# Patient Record
Sex: FEMALE | Race: WHITE | NOT HISPANIC OR LATINO
[De-identification: names, ages, dates, MRNs, and addresses within clinical notes are randomized per-mention and may not be internally consistent; named-entity substitution may affect disease eponyms.]

---

## 2017-07-18 ENCOUNTER — APPOINTMENT (OUTPATIENT)
Dept: ULTRASOUND IMAGING | Facility: IMAGING CENTER | Age: 57
End: 2017-07-18

## 2017-07-18 ENCOUNTER — APPOINTMENT (OUTPATIENT)
Dept: MAMMOGRAPHY | Facility: IMAGING CENTER | Age: 57
End: 2017-07-18

## 2017-07-18 ENCOUNTER — OUTPATIENT (OUTPATIENT)
Dept: OUTPATIENT SERVICES | Facility: HOSPITAL | Age: 57
LOS: 1 days | End: 2017-07-18
Payer: COMMERCIAL

## 2017-07-18 DIAGNOSIS — Z00.8 ENCOUNTER FOR OTHER GENERAL EXAMINATION: ICD-10-CM

## 2017-07-18 PROCEDURE — 77067 SCR MAMMO BI INCL CAD: CPT

## 2017-07-18 PROCEDURE — 77063 BREAST TOMOSYNTHESIS BI: CPT

## 2017-07-18 PROCEDURE — 76641 ULTRASOUND BREAST COMPLETE: CPT

## 2017-07-21 DIAGNOSIS — Z12.31 ENCOUNTER FOR SCREENING MAMMOGRAM FOR MALIGNANT NEOPLASM OF BREAST: ICD-10-CM

## 2017-07-21 DIAGNOSIS — R92.2 INCONCLUSIVE MAMMOGRAM: ICD-10-CM

## 2017-08-24 ENCOUNTER — RESULT REVIEW (OUTPATIENT)
Age: 57
End: 2017-08-24

## 2018-01-18 ENCOUNTER — APPOINTMENT (OUTPATIENT)
Dept: ULTRASOUND IMAGING | Facility: HOSPITAL | Age: 58
End: 2018-01-18

## 2018-01-18 ENCOUNTER — OUTPATIENT (OUTPATIENT)
Dept: OUTPATIENT SERVICES | Facility: HOSPITAL | Age: 58
LOS: 1 days | Discharge: HOME | End: 2018-01-18
Payer: COMMERCIAL

## 2018-01-18 DIAGNOSIS — R10.9 UNSPECIFIED ABDOMINAL PAIN: ICD-10-CM

## 2018-01-18 PROCEDURE — 76700 US EXAM ABDOM COMPLETE: CPT | Mod: 26

## 2018-10-01 ENCOUNTER — APPOINTMENT (OUTPATIENT)
Dept: MAMMOGRAPHY | Facility: IMAGING CENTER | Age: 58
End: 2018-10-01
Payer: COMMERCIAL

## 2018-10-01 ENCOUNTER — APPOINTMENT (OUTPATIENT)
Dept: ULTRASOUND IMAGING | Facility: IMAGING CENTER | Age: 58
End: 2018-10-01
Payer: COMMERCIAL

## 2018-10-01 ENCOUNTER — OUTPATIENT (OUTPATIENT)
Dept: OUTPATIENT SERVICES | Facility: HOSPITAL | Age: 58
LOS: 1 days | End: 2018-10-01
Payer: COMMERCIAL

## 2018-10-01 DIAGNOSIS — Z00.8 ENCOUNTER FOR OTHER GENERAL EXAMINATION: ICD-10-CM

## 2018-10-01 PROCEDURE — 77067 SCR MAMMO BI INCL CAD: CPT | Mod: 26

## 2018-10-01 PROCEDURE — 76641 ULTRASOUND BREAST COMPLETE: CPT | Mod: 26,50

## 2018-10-01 PROCEDURE — 76641 ULTRASOUND BREAST COMPLETE: CPT

## 2018-10-01 PROCEDURE — 77063 BREAST TOMOSYNTHESIS BI: CPT | Mod: 26

## 2018-10-01 PROCEDURE — 77067 SCR MAMMO BI INCL CAD: CPT

## 2018-10-01 PROCEDURE — 77063 BREAST TOMOSYNTHESIS BI: CPT

## 2018-10-23 ENCOUNTER — RESULT REVIEW (OUTPATIENT)
Age: 58
End: 2018-10-23

## 2018-10-28 ENCOUNTER — TRANSCRIPTION ENCOUNTER (OUTPATIENT)
Age: 58
End: 2018-10-28

## 2018-10-28 DIAGNOSIS — Z87.891 PERSONAL HISTORY OF NICOTINE DEPENDENCE: ICD-10-CM

## 2018-10-28 DIAGNOSIS — Z86.19 PERSONAL HISTORY OF OTHER INFECTIOUS AND PARASITIC DISEASES: ICD-10-CM

## 2018-10-28 DIAGNOSIS — R21 RASH AND OTHER NONSPECIFIC SKIN ERUPTION: ICD-10-CM

## 2018-10-28 DIAGNOSIS — J06.9 ACUTE UPPER RESPIRATORY INFECTION, UNSPECIFIED: ICD-10-CM

## 2018-10-28 DIAGNOSIS — Z87.898 PERSONAL HISTORY OF OTHER SPECIFIED CONDITIONS: ICD-10-CM

## 2018-11-16 PROBLEM — J06.9 ACUTE URI: Status: RESOLVED | Noted: 2018-11-16 | Resolved: 2018-11-16

## 2018-11-16 PROBLEM — Z86.19 HISTORY OF VIRAL INFECTION: Status: RESOLVED | Noted: 2018-11-16 | Resolved: 2018-11-16

## 2018-11-16 PROBLEM — Z87.898 HISTORY OF HEADACHE: Status: RESOLVED | Noted: 2018-11-16 | Resolved: 2018-11-16

## 2018-11-16 PROBLEM — R21 RASH: Status: RESOLVED | Noted: 2018-11-16 | Resolved: 2018-11-16

## 2018-11-27 ENCOUNTER — RECORD ABSTRACTING (OUTPATIENT)
Age: 58
End: 2018-11-27

## 2018-12-07 ENCOUNTER — APPOINTMENT (OUTPATIENT)
Dept: INTERNAL MEDICINE | Facility: CLINIC | Age: 58
End: 2018-12-07
Payer: COMMERCIAL

## 2018-12-07 ENCOUNTER — RESULT CHARGE (OUTPATIENT)
Age: 58
End: 2018-12-07

## 2018-12-07 ENCOUNTER — NON-APPOINTMENT (OUTPATIENT)
Age: 58
End: 2018-12-07

## 2018-12-07 VITALS
HEART RATE: 68 BPM | SYSTOLIC BLOOD PRESSURE: 98 MMHG | WEIGHT: 98 LBS | BODY MASS INDEX: 17.15 KG/M2 | DIASTOLIC BLOOD PRESSURE: 70 MMHG | HEIGHT: 63.5 IN

## 2018-12-07 DIAGNOSIS — Z87.19 PERSONAL HISTORY OF OTHER DISEASES OF THE DIGESTIVE SYSTEM: ICD-10-CM

## 2018-12-07 LAB
BILIRUB UR QL STRIP: NEGATIVE
CLARITY UR: CLEAR
COLLECTION METHOD: NORMAL
GLUCOSE UR-MCNC: NEGATIVE
HCG UR QL: 0.2 EU/DL
HGB UR QL STRIP.AUTO: NEGATIVE
KETONES UR-MCNC: NEGATIVE
LEUKOCYTE ESTERASE UR QL STRIP: NEGATIVE
NITRITE UR QL STRIP: NEGATIVE
PH UR STRIP: 5.5
PROT UR STRIP-MCNC: NEGATIVE
SP GR UR STRIP: 1.02

## 2018-12-07 PROCEDURE — 94010 BREATHING CAPACITY TEST: CPT

## 2018-12-07 PROCEDURE — 93000 ELECTROCARDIOGRAM COMPLETE: CPT

## 2018-12-07 PROCEDURE — 99396 PREV VISIT EST AGE 40-64: CPT | Mod: 25

## 2018-12-07 PROCEDURE — 36415 COLL VENOUS BLD VENIPUNCTURE: CPT

## 2018-12-07 PROCEDURE — 81002 URINALYSIS NONAUTO W/O SCOPE: CPT

## 2018-12-07 RX ORDER — MULTIVIT-MIN/FOLIC/VIT K/LYCOP 400-300MCG
1000 TABLET ORAL
Refills: 0 | Status: DISCONTINUED | COMMUNITY
End: 2018-12-07

## 2018-12-07 RX ORDER — ADHESIVE TAPE 3"X 2.3 YD
50 MCG TAPE, NON-MEDICATED TOPICAL
Refills: 0 | Status: ACTIVE | COMMUNITY
Start: 2018-12-07

## 2018-12-07 RX ORDER — ATORVASTATIN CALCIUM 20 MG/1
20 TABLET, FILM COATED ORAL
Refills: 0 | Status: ACTIVE | COMMUNITY

## 2018-12-07 NOTE — HEALTH RISK ASSESSMENT
[Excellent] : ~his/her~  mood as  excellent [Patient reported mammogram was normal] : Patient reported mammogram was normal [Patient reported PAP Smear was normal] : Patient reported PAP Smear was normal [Patient reported bone density results were abnormal] : Patient reported bone density results were abnormal [Patient reported colonoscopy was normal] : Patient reported colonoscopy was normal [FreeTextEntry1] : Although she walks several miles every day for exercise she becomes dyspneic on climbing several flights of stairs. [] : No [MammogramDate] : 2018 [BoneDensityDate] : 2016 [BoneDensityComments] : osteoporosis under treatment  [ColonoscopyDate] : 2018 [ColonoscopyComments] : Kavin have seen / treated

## 2018-12-07 NOTE — ASSESSMENT
[FreeTextEntry1] : Electrocardiogram demonstrates a right atrial hypertrophy/enlargement and an echocardiogram will be done.\par \par She is on the adequate treatment for osteoporosis with currently a under the direction of Dr. Rosas.\par \par The breathlessness on effort seems physiological

## 2018-12-07 NOTE — HISTORY OF PRESENT ILLNESS
[FreeTextEntry1] : The patient is here for periodic assessment comprehensive examination.  She was last here on March 5, 2018 [de-identified] : She is known to have hypercholesterolemia and that was treated with atorvastatin 20\par She is known to have osteoporosis and is under the care of the lia Rosas M.D. who has been treating her with Prolia.  She will be due for a bone density several months from now.\par \par She had mammography a few months ago and that was negative.\par \par She was seen by gynecology and is to have a hysterectomy sonography because of a polyp and that is planned under the direction of the gynecologist.\par \par She has episodic rhinosinusitis.\par

## 2018-12-07 NOTE — RESULTS/DATA
[NSR] : normal sinus rhythm [Right Atrial Hypertrophy] : right atrial hypertrophy (RAH) [Normal ST Segments] : the ST segments are normal [FreeTextEntry2] : right atrial enlargement

## 2018-12-08 LAB
ALBUMIN SERPL ELPH-MCNC: 4.7 G/DL
ALP BLD-CCNC: 45 U/L
ALT SERPL-CCNC: 19 U/L
ANION GAP SERPL CALC-SCNC: 11 MMOL/L
AST SERPL-CCNC: 17 U/L
BASOPHILS # BLD AUTO: 0.02 K/UL
BASOPHILS NFR BLD AUTO: 0.4 %
BILIRUB SERPL-MCNC: 0.3 MG/DL
BUN SERPL-MCNC: 20 MG/DL
CALCIUM SERPL-MCNC: 9.2 MG/DL
CALCIUM SERPL-MCNC: 9.2 MG/DL
CHLORIDE SERPL-SCNC: 102 MMOL/L
CHOLEST SERPL-MCNC: 167 MG/DL
CHOLEST/HDLC SERPL: 2.1 RATIO
CO2 SERPL-SCNC: 30 MMOL/L
CREAT SERPL-MCNC: 0.83 MG/DL
EOSINOPHIL # BLD AUTO: 0.04 K/UL
EOSINOPHIL NFR BLD AUTO: 0.7 %
GLUCOSE SERPL-MCNC: 89 MG/DL
HCT VFR BLD CALC: 37.7 %
HDLC SERPL-MCNC: 78 MG/DL
HGB BLD-MCNC: 11.7 G/DL
IMM GRANULOCYTES NFR BLD AUTO: 0.2 %
LDLC SERPL CALC-MCNC: 77 MG/DL
LYMPHOCYTES # BLD AUTO: 1.49 K/UL
LYMPHOCYTES NFR BLD AUTO: 26.2 %
MAN DIFF?: NORMAL
MCHC RBC-ENTMCNC: 30.6 PG
MCHC RBC-ENTMCNC: 31 GM/DL
MCV RBC AUTO: 98.7 FL
MONOCYTES # BLD AUTO: 0.48 K/UL
MONOCYTES NFR BLD AUTO: 8.5 %
NEUTROPHILS # BLD AUTO: 3.64 K/UL
NEUTROPHILS NFR BLD AUTO: 64 %
PARATHYROID HORMONE INTACT: 69 PG/ML
PLATELET # BLD AUTO: 275 K/UL
POTASSIUM SERPL-SCNC: 4.3 MMOL/L
PROT SERPL-MCNC: 7.1 G/DL
RBC # BLD: 3.82 M/UL
RBC # FLD: 13.5 %
SODIUM SERPL-SCNC: 143 MMOL/L
TRIGL SERPL-MCNC: 60 MG/DL
TSH SERPL-ACNC: 1.46 UIU/ML
WBC # FLD AUTO: 5.68 K/UL

## 2018-12-14 LAB — T4 FREE SERPL DIALY-MCNC: 1.3 NG/DL

## 2018-12-17 ENCOUNTER — APPOINTMENT (OUTPATIENT)
Dept: INTERNAL MEDICINE | Facility: CLINIC | Age: 58
End: 2018-12-17
Payer: COMMERCIAL

## 2018-12-17 PROCEDURE — 93306 TTE W/DOPPLER COMPLETE: CPT

## 2019-10-29 ENCOUNTER — RESULT REVIEW (OUTPATIENT)
Age: 59
End: 2019-10-29

## 2019-10-31 ENCOUNTER — APPOINTMENT (OUTPATIENT)
Dept: ULTRASOUND IMAGING | Facility: IMAGING CENTER | Age: 59
End: 2019-10-31

## 2019-10-31 ENCOUNTER — APPOINTMENT (OUTPATIENT)
Dept: MAMMOGRAPHY | Facility: IMAGING CENTER | Age: 59
End: 2019-10-31

## 2019-11-12 ENCOUNTER — APPOINTMENT (OUTPATIENT)
Dept: ULTRASOUND IMAGING | Facility: CLINIC | Age: 59
End: 2019-11-12
Payer: COMMERCIAL

## 2019-11-12 ENCOUNTER — OUTPATIENT (OUTPATIENT)
Dept: OUTPATIENT SERVICES | Facility: HOSPITAL | Age: 59
LOS: 1 days | End: 2019-11-12
Payer: COMMERCIAL

## 2019-11-12 ENCOUNTER — APPOINTMENT (OUTPATIENT)
Dept: MAMMOGRAPHY | Facility: CLINIC | Age: 59
End: 2019-11-12
Payer: COMMERCIAL

## 2019-11-12 DIAGNOSIS — Z00.8 ENCOUNTER FOR OTHER GENERAL EXAMINATION: ICD-10-CM

## 2019-11-12 PROCEDURE — 76641 ULTRASOUND BREAST COMPLETE: CPT | Mod: 26,50

## 2019-11-12 PROCEDURE — G0279: CPT

## 2019-11-12 PROCEDURE — 77066 DX MAMMO INCL CAD BI: CPT | Mod: 26

## 2019-11-12 PROCEDURE — 77066 DX MAMMO INCL CAD BI: CPT

## 2019-11-12 PROCEDURE — 76641 ULTRASOUND BREAST COMPLETE: CPT

## 2019-11-12 PROCEDURE — G0279: CPT | Mod: 26

## 2019-12-04 ENCOUNTER — OUTPATIENT (OUTPATIENT)
Dept: OUTPATIENT SERVICES | Facility: HOSPITAL | Age: 59
LOS: 1 days | End: 2019-12-04
Payer: COMMERCIAL

## 2019-12-04 ENCOUNTER — APPOINTMENT (OUTPATIENT)
Dept: MRI IMAGING | Facility: CLINIC | Age: 59
End: 2019-12-04
Payer: COMMERCIAL

## 2019-12-04 DIAGNOSIS — Z00.8 ENCOUNTER FOR OTHER GENERAL EXAMINATION: ICD-10-CM

## 2019-12-04 PROCEDURE — 77049 MRI BREAST C-+ W/CAD BI: CPT | Mod: 26

## 2019-12-04 PROCEDURE — C8908: CPT

## 2019-12-04 PROCEDURE — C8937: CPT

## 2019-12-04 PROCEDURE — A9585: CPT

## 2019-12-10 ENCOUNTER — NON-APPOINTMENT (OUTPATIENT)
Age: 59
End: 2019-12-10

## 2019-12-10 ENCOUNTER — APPOINTMENT (OUTPATIENT)
Dept: INTERNAL MEDICINE | Facility: CLINIC | Age: 59
End: 2019-12-10
Payer: COMMERCIAL

## 2019-12-10 VITALS
HEART RATE: 60 BPM | WEIGHT: 100 LBS | DIASTOLIC BLOOD PRESSURE: 78 MMHG | SYSTOLIC BLOOD PRESSURE: 118 MMHG | OXYGEN SATURATION: 99 % | BODY MASS INDEX: 17.72 KG/M2 | HEIGHT: 63 IN

## 2019-12-10 DIAGNOSIS — Z87.09 PERSONAL HISTORY OF OTHER DISEASES OF THE RESPIRATORY SYSTEM: ICD-10-CM

## 2019-12-10 LAB
25(OH)D3 SERPL-MCNC: 46.7 NG/ML
ALBUMIN SERPL ELPH-MCNC: 4.9 G/DL
ALP BLD-CCNC: 48 U/L
ALT SERPL-CCNC: 15 U/L
ANION GAP SERPL CALC-SCNC: 15 MMOL/L
AST SERPL-CCNC: 15 U/L
BASOPHILS # BLD AUTO: 0.04 K/UL
BASOPHILS NFR BLD AUTO: 0.6 %
BILIRUB SERPL-MCNC: 0.5 MG/DL
BILIRUB UR QL STRIP: NEGATIVE
BUN SERPL-MCNC: 17 MG/DL
CALCIUM SERPL-MCNC: 9.7 MG/DL
CHLORIDE SERPL-SCNC: 101 MMOL/L
CHOLEST SERPL-MCNC: 186 MG/DL
CHOLEST/HDLC SERPL: 2.6 RATIO
CLARITY UR: CLEAR
CO2 SERPL-SCNC: 25 MMOL/L
COLLECTION METHOD: NORMAL
CREAT SERPL-MCNC: 0.84 MG/DL
EOSINOPHIL # BLD AUTO: 0.06 K/UL
EOSINOPHIL NFR BLD AUTO: 0.9 %
ESTIMATED AVERAGE GLUCOSE: 108 MG/DL
GLUCOSE SERPL-MCNC: 88 MG/DL
GLUCOSE UR-MCNC: NORMAL
HBA1C MFR BLD HPLC: 5.4 %
HCG UR QL: 0.2 EU/DL
HCT VFR BLD CALC: 38.7 %
HDLC SERPL-MCNC: 72 MG/DL
HGB BLD-MCNC: 12.4 G/DL
HGB UR QL STRIP.AUTO: 5.5
IMM GRANULOCYTES NFR BLD AUTO: 0.4 %
KETONES UR-MCNC: 1.01
LDLC SERPL CALC-MCNC: 91 MG/DL
LEUKOCYTE ESTERASE UR QL STRIP: NEGATIVE
LYMPHOCYTES # BLD AUTO: 1.56 K/UL
LYMPHOCYTES NFR BLD AUTO: 22.8 %
MAN DIFF?: NORMAL
MCHC RBC-ENTMCNC: 30.9 PG
MCHC RBC-ENTMCNC: 32 GM/DL
MCV RBC AUTO: 96.5 FL
MONOCYTES # BLD AUTO: 0.53 K/UL
MONOCYTES NFR BLD AUTO: 7.8 %
NEUTROPHILS # BLD AUTO: 4.61 K/UL
NEUTROPHILS NFR BLD AUTO: 67.5 %
NITRITE UR QL STRIP: NEGATIVE
PH UR STRIP: NORMAL
PLATELET # BLD AUTO: 300 K/UL
POTASSIUM SERPL-SCNC: 3.9 MMOL/L
PROT SERPL-MCNC: 7.1 G/DL
PROT UR STRIP-MCNC: NORMAL
RBC # BLD: 4.01 M/UL
RBC # FLD: 11.9 %
SODIUM SERPL-SCNC: 141 MMOL/L
SP GR UR STRIP: NORMAL
T4 FREE SERPL-MCNC: 1.4 NG/DL
TRIGL SERPL-MCNC: 117 MG/DL
TSH SERPL-ACNC: 1.91 UIU/ML
WBC # FLD AUTO: 6.83 K/UL

## 2019-12-10 PROCEDURE — 99396 PREV VISIT EST AGE 40-64: CPT | Mod: 25

## 2019-12-10 PROCEDURE — 81003 URINALYSIS AUTO W/O SCOPE: CPT | Mod: QW

## 2019-12-10 PROCEDURE — 93000 ELECTROCARDIOGRAM COMPLETE: CPT

## 2019-12-10 PROCEDURE — 36415 COLL VENOUS BLD VENIPUNCTURE: CPT

## 2019-12-10 PROCEDURE — 94010 BREATHING CAPACITY TEST: CPT

## 2019-12-10 NOTE — HEALTH RISK ASSESSMENT
[Excellent] : ~his/her~  mood as  excellent [No falls in past year] : Patient reported no falls in the past year [0] : 2) Feeling down, depressed, or hopeless: Not at all (0) [] : No [de-identified] : as above [de-identified] : trivial [de-identified] : active    [de-identified] : lite yolandaer [[id number]] : 0 [Change in mental status noted] : No change in mental status noted [Language] : denies difficulty with language [Reports changes in hearing] : Reports no changes in hearing [Reports changes in vision] : Reports no changes in vision [MammogramDate] : 2019 [PapSmearDate] : 2019 [BoneDensityDate] : 2018 [ColonoscopyDate] : up to date [de-identified] : recent ENT eval

## 2019-12-10 NOTE — RESULTS/DATA
[Normal] : The 12 - lead ECG is normal [NSR] : normal sinus rhythm [Normal QRS] : the QRS is normal [P Waves Normal] : the P wave is normal [No Interval Change] : no interval change [Normal ST Segments] : the ST segments are normal

## 2019-12-10 NOTE — ASSESSMENT
[FreeTextEntry1] : Doing well overall\par \par Will have reports of breast biopsy and pelvic ultrasound sent here \par

## 2019-12-10 NOTE — HISTORY OF PRESENT ILLNESS
[FreeTextEntry1] : Periodic comprehensive and continuation of care  [de-identified] : Osteoporosis\par MD:  Ralph (endo)\par Rx: Prolia\par DEXA reported as showing improvement\par \par Breast imaging done\par Questionable lesion R (DiRisi)\par For Bx this week\par \par Gyn recently\par MD: Adriana\par For transvaginal sono in the next week or two\par \par Dyspepsia\par MD: Donny\par Put on prn pentoprazole\par

## 2019-12-11 ENCOUNTER — RESULT REVIEW (OUTPATIENT)
Age: 59
End: 2019-12-11

## 2019-12-11 ENCOUNTER — TRANSCRIPTION ENCOUNTER (OUTPATIENT)
Age: 59
End: 2019-12-11

## 2019-12-11 ENCOUNTER — APPOINTMENT (OUTPATIENT)
Dept: MRI IMAGING | Facility: CLINIC | Age: 59
End: 2019-12-11
Payer: COMMERCIAL

## 2019-12-11 ENCOUNTER — OUTPATIENT (OUTPATIENT)
Dept: OUTPATIENT SERVICES | Facility: HOSPITAL | Age: 59
LOS: 1 days | End: 2019-12-11
Payer: COMMERCIAL

## 2019-12-11 DIAGNOSIS — N63.20 UNSPECIFIED LUMP IN THE LEFT BREAST, UNSPECIFIED QUADRANT: ICD-10-CM

## 2019-12-11 PROCEDURE — 88305 TISSUE EXAM BY PATHOLOGIST: CPT

## 2019-12-11 PROCEDURE — 77065 DX MAMMO INCL CAD UNI: CPT

## 2019-12-11 PROCEDURE — 77065 DX MAMMO INCL CAD UNI: CPT | Mod: 26,RT

## 2019-12-11 PROCEDURE — 88305 TISSUE EXAM BY PATHOLOGIST: CPT | Mod: 26

## 2019-12-11 PROCEDURE — 88360 TUMOR IMMUNOHISTOCHEM/MANUAL: CPT | Mod: 26,59

## 2019-12-11 PROCEDURE — 88341 IMHCHEM/IMCYTCHM EA ADD ANTB: CPT

## 2019-12-11 PROCEDURE — A9585: CPT

## 2019-12-11 PROCEDURE — 19085 BX BREAST 1ST LESION MR IMAG: CPT | Mod: RT

## 2019-12-11 PROCEDURE — A4648: CPT

## 2019-12-11 PROCEDURE — 88342 IMHCHEM/IMCYTCHM 1ST ANTB: CPT | Mod: 26

## 2019-12-11 PROCEDURE — 19085 BX BREAST 1ST LESION MR IMAG: CPT

## 2019-12-11 PROCEDURE — 88360 TUMOR IMMUNOHISTOCHEM/MANUAL: CPT

## 2020-02-07 ENCOUNTER — APPOINTMENT (OUTPATIENT)
Dept: INTERNAL MEDICINE | Facility: CLINIC | Age: 60
End: 2020-02-07
Payer: COMMERCIAL

## 2020-02-07 VITALS
BODY MASS INDEX: 17.72 KG/M2 | SYSTOLIC BLOOD PRESSURE: 140 MMHG | HEART RATE: 69 BPM | WEIGHT: 100 LBS | OXYGEN SATURATION: 100 % | HEIGHT: 63 IN | DIASTOLIC BLOOD PRESSURE: 82 MMHG

## 2020-02-07 DIAGNOSIS — Z01.818 ENCOUNTER FOR OTHER PREPROCEDURAL EXAMINATION: ICD-10-CM

## 2020-02-07 PROCEDURE — 99215 OFFICE O/P EST HI 40 MIN: CPT

## 2020-02-07 NOTE — CONSULT LETTER
[Dear  ___] : Dear  [unfilled], [Consult Letter:] : I had the pleasure of evaluating your patient, [unfilled]. [Please see my note below.] : Please see my note below. [Consult Closing:] : Thank you very much for allowing me to participate in the care of this patient.  If you have any questions, please do not hesitate to contact me. [FreeTextEntry2] : Demarcus Metzger MD  \par [address]\par [place], [place]\par \par Rajiv Mitchell MD\par [address]\par [address]\par [place], [place]

## 2020-02-07 NOTE — HISTORY OF PRESENT ILLNESS
[No Pertinent Cardiac History] : no history of aortic stenosis, atrial fibrillation, coronary artery disease, recent myocardial infarction, or implantable device/pacemaker [Aortic Stenosis] : no aortic stenosis [Atrial Fibrillation] : no atrial fibrillation [Coronary Artery Disease] : no coronary artery disease [Implantable Device/Pacemaker] : no implantable device/pacemaker [Recent Myocardial Infarction] : no recent myocardial infarction [Asthma] : no asthma [COPD] : no COPD [Smoker] : not a smoker [Family Member] : no family member with adverse anesthesia reaction/sudden death [Sleep Apnea] : no sleep apnea [Chronic Anticoagulation] : no chronic anticoagulation [Self] : no previous adverse anesthesia reaction [Chronic Kidney Disease] : no chronic kidney disease [Diabetes] : no diabetes [(Patient denies any chest pain, claudication, dyspnea on exertion, orthopnea, palpitations or syncope)] : Patient denies any chest pain, claudication, dyspnea on exertion, orthopnea, palpitations or syncope [FreeTextEntry1] : bilateral mastectomy with plastic reconstruction [FreeTextEntry2] : Wednesday February 12, 2020  to be done at [facility] in [place], [place] [FreeTextEntry3] : Demarcus Metzger MD and Rajiv Mitchell MD [FreeTextEntry4] : Breast neoplasm   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

## 2020-02-07 NOTE — RESULTS/DATA
[] : results reviewed [de-identified] : acceptable [de-identified] : acceptable [de-identified] : acceptable [de-identified] : acceptable [de-identified] : sinus arrhythmia / LAE / suggestion of old septal infact   No acute changes

## 2020-03-17 ENCOUNTER — TRANSCRIPTION ENCOUNTER (OUTPATIENT)
Age: 60
End: 2020-03-17

## 2020-10-07 ENCOUNTER — APPOINTMENT (OUTPATIENT)
Dept: INTERNAL MEDICINE | Facility: CLINIC | Age: 60
End: 2020-10-07

## 2020-11-23 ENCOUNTER — RESULT REVIEW (OUTPATIENT)
Age: 60
End: 2020-11-23

## 2020-12-30 ENCOUNTER — NON-APPOINTMENT (OUTPATIENT)
Age: 60
End: 2020-12-30

## 2020-12-30 ENCOUNTER — APPOINTMENT (OUTPATIENT)
Dept: INTERNAL MEDICINE | Facility: CLINIC | Age: 60
End: 2020-12-30
Payer: COMMERCIAL

## 2020-12-30 VITALS
HEART RATE: 80 BPM | SYSTOLIC BLOOD PRESSURE: 168 MMHG | WEIGHT: 100 LBS | BODY MASS INDEX: 17.72 KG/M2 | DIASTOLIC BLOOD PRESSURE: 80 MMHG | HEIGHT: 63 IN

## 2020-12-30 VITALS — SYSTOLIC BLOOD PRESSURE: 128 MMHG | DIASTOLIC BLOOD PRESSURE: 76 MMHG

## 2020-12-30 PROCEDURE — G0442 ANNUAL ALCOHOL SCREEN 15 MIN: CPT | Mod: NC

## 2020-12-30 PROCEDURE — 99072 ADDL SUPL MATRL&STAF TM PHE: CPT

## 2020-12-30 PROCEDURE — 99396 PREV VISIT EST AGE 40-64: CPT | Mod: 25

## 2020-12-30 PROCEDURE — 36415 COLL VENOUS BLD VENIPUNCTURE: CPT

## 2020-12-30 PROCEDURE — 81003 URINALYSIS AUTO W/O SCOPE: CPT | Mod: QW

## 2020-12-30 PROCEDURE — 93000 ELECTROCARDIOGRAM COMPLETE: CPT | Mod: 59

## 2020-12-30 PROCEDURE — G0444 DEPRESSION SCREEN ANNUAL: CPT | Mod: NC,59

## 2020-12-30 RX ORDER — DENOSUMAB 60 MG/ML
60 INJECTION SUBCUTANEOUS
Refills: 0 | Status: ACTIVE | COMMUNITY

## 2020-12-30 NOTE — HEALTH RISK ASSESSMENT
[Very Good] : ~his/her~  mood as very good [Yes] : Yes [2 - 4 times a month (2 pts)] : 2-4 times a month (2 points) [1 or 2 (0 pts)] : 1 or 2 (0 points) [Never (0 pts)] : Never (0 points) [No] : In the past 12 months have you used drugs other than those required for medical reasons? No [No falls in past year] : Patient reported no falls in the past year [0] : 2) Feeling down, depressed, or hopeless: Not at all (0) [No Retinopathy] : No retinopathy [Patient declined mammogram] : Patient declined mammogram [Patient reported colonoscopy was normal] : Patient reported colonoscopy was normal [None] : None [With Significant Other] : lives with significant other [Employed] : employed [] :  [# Of Children ___] : has [unfilled] children [Fully functional (bathing, dressing, toileting, transferring, walking, feeding)] : Fully functional (bathing, dressing, toileting, transferring, walking, feeding) [Fully functional (using the telephone, shopping, preparing meals, housekeeping, doing laundry, using] : Fully functional and needs no help or supervision to perform IADLs (using the telephone, shopping, preparing meals, housekeeping, doing laundry, using transportation, managing medications and managing finances) [Reports changes in dental health] : Reports changes in dental health [Smoke Detector] : smoke detector [Carbon Monoxide Detector] : carbon monoxide detector [Seat Belt] :  uses seat belt [Sunscreen] : uses sunscreen [] : No [Audit-CScore] : 2 [de-identified] : walk [FYV2Kfnjs] : 0 [EyeExamDate] : 2020 [Reports changes in hearing] : Reports no changes in hearing [Reports changes in vision] : Reports no changes in vision [ColonoscopyComments] : 6 years  dr. [place]ledo.  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

## 2020-12-30 NOTE — HISTORY OF PRESENT ILLNESS
[FreeTextEntry1] : Taking over care from Dr. Vargas.\par Has osteoporosis seeing Dr. Chasity jeffrey On prolia for 3 years\par  feeling well\par Being treated for osteoporosis and high cholesterol\par  s/p right mastectomy 2/2020\par  Gi- Dr. Tuan Lyons.

## 2021-01-26 LAB
25(OH)D3 SERPL-MCNC: 61.4 NG/ML
ALBUMIN SERPL ELPH-MCNC: 5.1 G/DL
ALP BLD-CCNC: 62 U/L
ALT SERPL-CCNC: 16 U/L
ANION GAP SERPL CALC-SCNC: 15 MMOL/L
AST SERPL-CCNC: 21 U/L
BASOPHILS # BLD AUTO: 0.05 K/UL
BASOPHILS NFR BLD AUTO: 0.6 %
BILIRUB SERPL-MCNC: 0.4 MG/DL
BUN SERPL-MCNC: 16 MG/DL
CALCIUM SERPL-MCNC: 10.1 MG/DL
CALCIUM SERPL-MCNC: 10.5 MG/DL
CHLORIDE SERPL-SCNC: 101 MMOL/L
CHOLEST SERPL-MCNC: 194 MG/DL
CO2 SERPL-SCNC: 26 MMOL/L
COLLAGEN CTX SERPL-MCNC: 53 PG/ML
CREAT SERPL-MCNC: 0.91 MG/DL
EOSINOPHIL # BLD AUTO: 0.03 K/UL
EOSINOPHIL NFR BLD AUTO: 0.4 %
ESTIMATED AVERAGE GLUCOSE: 105 MG/DL
FREE T3-ESOTERIX: 2.8 PG/ML
GLUCOSE SERPL-MCNC: 92 MG/DL
HBA1C MFR BLD HPLC: 5.3 %
HCT VFR BLD CALC: 39.7 %
HDLC SERPL-MCNC: 86 MG/DL
HGB BLD-MCNC: 12.6 G/DL
IMM GRANULOCYTES NFR BLD AUTO: 0.4 %
LDLC SERPL CALC-MCNC: 95 MG/DL
LYMPHOCYTES # BLD AUTO: 1.16 K/UL
LYMPHOCYTES NFR BLD AUTO: 13.8 %
MAN DIFF?: NORMAL
MCHC RBC-ENTMCNC: 31.4 PG
MCHC RBC-ENTMCNC: 31.7 GM/DL
MCV RBC AUTO: 99 FL
MONOCYTES # BLD AUTO: 0.52 K/UL
MONOCYTES NFR BLD AUTO: 6.2 %
NEUTROPHILS # BLD AUTO: 6.6 K/UL
NEUTROPHILS NFR BLD AUTO: 78.6 %
NONHDLC SERPL-MCNC: 108 MG/DL
PARATHYROID HORMONE INTACT: 24 PG/ML
PLATELET # BLD AUTO: 322 K/UL
POTASSIUM SERPL-SCNC: 4.4 MMOL/L
PROT SERPL-MCNC: 7.6 G/DL
RBC # BLD: 4.01 M/UL
RBC # FLD: 12.5 %
SAVE SPECIMEN: NORMAL
SODIUM SERPL-SCNC: 141 MMOL/L
T4 FREE SERPL-MCNC: 1.3 NG/DL
T4 SERPL-MCNC: 9.1 UG/DL
TRIGL SERPL-MCNC: 65 MG/DL
TSH SERPL-ACNC: 1.53 UIU/ML
VIT B12 SERPL-MCNC: 580 PG/ML
WBC # FLD AUTO: 8.39 K/UL

## 2021-03-31 ENCOUNTER — APPOINTMENT (OUTPATIENT)
Dept: OTOLARYNGOLOGY | Facility: CLINIC | Age: 61
End: 2021-03-31
Payer: COMMERCIAL

## 2021-03-31 VITALS
BODY MASS INDEX: 18.07 KG/M2 | HEIGHT: 63 IN | TEMPERATURE: 97.8 F | SYSTOLIC BLOOD PRESSURE: 127 MMHG | DIASTOLIC BLOOD PRESSURE: 81 MMHG | WEIGHT: 102 LBS | HEART RATE: 73 BPM

## 2021-03-31 DIAGNOSIS — R07.0 PAIN IN THROAT: ICD-10-CM

## 2021-03-31 DIAGNOSIS — H93.8X1 OTHER SPECIFIED DISORDERS OF RIGHT EAR: ICD-10-CM

## 2021-03-31 PROCEDURE — 99072 ADDL SUPL MATRL&STAF TM PHE: CPT

## 2021-03-31 PROCEDURE — 99204 OFFICE O/P NEW MOD 45 MIN: CPT | Mod: 25

## 2021-03-31 PROCEDURE — 92557 COMPREHENSIVE HEARING TEST: CPT

## 2021-03-31 PROCEDURE — 92567 TYMPANOMETRY: CPT

## 2021-03-31 PROCEDURE — G0268 REMOVAL OF IMPACTED WAX MD: CPT

## 2021-03-31 NOTE — END OF VISIT
[FreeTextEntry3] : I personally saw and examined MARCY STALLINGS in detail.  I spoke to DIANELYS Lieberman regarding the assessment and plan of care. I performed the procedures and relevant physical exam.  I have reviewed the above assessment and plan of care and I agree.  I have made changes to the body of the note wherever necessary and appropriate.

## 2021-03-31 NOTE — CONSULT LETTER
[Dear  ___] : Dear  [unfilled], [Consult Letter:] : I had the pleasure of evaluating your patient, [unfilled]. [Please see my note below.] : Please see my note below. [Consult Closing:] : Thank you very much for allowing me to participate in the care of this patient.  If you have any questions, please do not hesitate to contact me. [Sincerely,] : Sincerely, [FreeTextEntry3] : Sincerely, \par \par Alva Young MD\par Otolaryngology- Facial Plastics \par [address] [facility] [address]\[place] [address]\par (P) - [phone]\par (F) - [phone]\par \par

## 2021-03-31 NOTE — PHYSICAL EXAM
[Midline] : trachea located in midline position [Normal] : no rashes [de-identified] : cerumen impaction right  [de-identified] : myringosclerosis centrally [de-identified] : small mandible

## 2021-03-31 NOTE — ASSESSMENT
[FreeTextEntry1] : Patient is a 60 year old female who presents with RIGHT ear fullness and throat discomfort. Audio today showed. \par Plan\par - audio today wnl\par - sensation that right ear cannot be popped - may still be mild eustachian tube dysfunction so can consider flonase trial\par - - educated and counseled on appropriate flonase use including daily use and spraying left nostril with right hand and vice versa\par - d/w patient that if there is no benefit after 4 weeks of use, will discontinue\par - patient understands and will f/up in 6 months for repeat audio to r/o low level hearing loss unless symptoms completely resolve. \par - recommend using water pick instead of chopstick to remove food impacted in tonsils, she understands.

## 2021-03-31 NOTE — HISTORY OF PRESENT ILLNESS
[de-identified] : Patient is a 60 year old female who presents with right sided ear fullness and throat pain with intermittent episodes of food getting stuck behind her tonsils. Patient says she is unable to pop her right ear for several months.  She denies change in hearing, tinnitus, and otorrhea.  No h/o BMT, no known h/o ear infections.  No autophony. \par \par She also notes that she has intermittent right sided throat irritation, Patient says she uses a chopstick to scoop out food from behind her tonsil and sometimes irritates her throat with the chopstick. She denies difficulty swallowing or drinking. No h/o throat infections.

## 2021-03-31 NOTE — REASON FOR VISIT
[Initial Evaluation] : an initial evaluation for [FreeTextEntry2] : right sided ear fullness and throat pain

## 2021-09-21 ENCOUNTER — RX RENEWAL (OUTPATIENT)
Age: 61
End: 2021-09-21

## 2021-09-21 RX ORDER — FLUTICASONE PROPIONATE 50 UG/1
50 SPRAY, METERED NASAL DAILY
Qty: 16 | Refills: 2 | Status: ACTIVE | COMMUNITY
Start: 2021-03-31 | End: 1900-01-01

## 2021-12-15 ENCOUNTER — NON-APPOINTMENT (OUTPATIENT)
Age: 61
End: 2021-12-15

## 2021-12-21 ENCOUNTER — APPOINTMENT (OUTPATIENT)
Dept: OBGYN | Facility: CLINIC | Age: 61
End: 2021-12-21
Payer: COMMERCIAL

## 2021-12-21 VITALS
BODY MASS INDEX: 17.07 KG/M2 | SYSTOLIC BLOOD PRESSURE: 110 MMHG | HEIGHT: 64 IN | DIASTOLIC BLOOD PRESSURE: 70 MMHG | WEIGHT: 100 LBS

## 2021-12-21 DIAGNOSIS — Z80.0 FAMILY HISTORY OF MALIGNANT NEOPLASM OF DIGESTIVE ORGANS: ICD-10-CM

## 2021-12-21 PROCEDURE — 99396 PREV VISIT EST AGE 40-64: CPT

## 2021-12-21 PROCEDURE — 82270 OCCULT BLOOD FECES: CPT

## 2021-12-21 RX ORDER — ZOLPIDEM TARTRATE 10 MG/1
10 TABLET ORAL
Qty: 30 | Refills: 5 | Status: DISCONTINUED | COMMUNITY
End: 2021-12-21

## 2021-12-28 LAB
CYTOLOGY CVX/VAG DOC THIN PREP: ABNORMAL
HPV HIGH+LOW RISK DNA PNL CVX: NOT DETECTED

## 2022-02-01 ENCOUNTER — APPOINTMENT (OUTPATIENT)
Dept: INTERNAL MEDICINE | Facility: CLINIC | Age: 62
End: 2022-02-01
Payer: COMMERCIAL

## 2022-02-01 ENCOUNTER — NON-APPOINTMENT (OUTPATIENT)
Age: 62
End: 2022-02-01

## 2022-02-01 ENCOUNTER — RESULT CHARGE (OUTPATIENT)
Age: 62
End: 2022-02-01

## 2022-02-01 VITALS
BODY MASS INDEX: 17.24 KG/M2 | WEIGHT: 101 LBS | DIASTOLIC BLOOD PRESSURE: 80 MMHG | OXYGEN SATURATION: 100 % | SYSTOLIC BLOOD PRESSURE: 130 MMHG | HEIGHT: 64 IN | TEMPERATURE: 97.8 F | HEART RATE: 69 BPM

## 2022-02-01 PROCEDURE — 99396 PREV VISIT EST AGE 40-64: CPT | Mod: 25

## 2022-02-01 PROCEDURE — G0442 ANNUAL ALCOHOL SCREEN 15 MIN: CPT | Mod: NC

## 2022-02-01 PROCEDURE — G0444 DEPRESSION SCREEN ANNUAL: CPT | Mod: NC,59

## 2022-02-01 PROCEDURE — 81003 URINALYSIS AUTO W/O SCOPE: CPT | Mod: QW

## 2022-02-01 PROCEDURE — 36415 COLL VENOUS BLD VENIPUNCTURE: CPT

## 2022-02-01 PROCEDURE — 93000 ELECTROCARDIOGRAM COMPLETE: CPT | Mod: 59

## 2022-02-01 NOTE — HEALTH RISK ASSESSMENT
[Very Good] : ~his/her~  mood as very good [Never] : Never [Yes] : Yes [2 - 4 times a month (2 pts)] : 2-4 times a month (2 points) [1 or 2 (0 pts)] : 1 or 2 (0 points) [Never (0 pts)] : Never (0 points) [No falls in past year] : Patient reported no falls in the past year [0] : 2) Feeling down, depressed, or hopeless: Not at all (0) [Audit-CScore] : 2 [XRG6Cykyf] : 0 [EyeExamDate] : 2021 [Change in mental status noted] : Change in mental status noted [None] : None [With Family] : lives with family [Employed] : employed [] :  [# Of Children ___] : has [unfilled] children [Fully functional (bathing, dressing, toileting, transferring, walking, feeding)] : Fully functional (bathing, dressing, toileting, transferring, walking, feeding) [Fully functional (using the telephone, shopping, preparing meals, housekeeping, doing laundry, using] : Fully functional and needs no help or supervision to perform IADLs (using the telephone, shopping, preparing meals, housekeeping, doing laundry, using transportation, managing medications and managing finances) [Reports changes in hearing] : Reports no changes in hearing [Reports changes in vision] : Reports no changes in vision [Reports normal functional visual acuity (ie: able to read med bottle)] : Reports poor functional visual acuity.  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

## 2022-02-01 NOTE — HISTORY OF PRESENT ILLNESS
[FreeTextEntry1] : Taking over care from Dr. Vargas.\par Has osteoporosis seeing Dr. Chasity jeffrey On prolia for 4 years\par  feeling well\par Being treated for osteoporosis and high cholesterol\par  s/p right mastectomy 2/2020- completed reconstruction\par  GI- Dr. Tuan Lyons. \par feels she is having GERD. - Began happening with late night snack . \par July had stress fracture left foot. and can't excrercise\par  had 3 pfizer vaccines and flu vaccine\par

## 2022-02-07 LAB
25(OH)D3 SERPL-MCNC: 52.7 NG/ML
ALBUMIN SERPL ELPH-MCNC: 5.1 G/DL
ALP BLD-CCNC: 52 U/L
ALT SERPL-CCNC: 16 U/L
ANION GAP SERPL CALC-SCNC: 11 MMOL/L
AST SERPL-CCNC: 17 U/L
BASOPHILS # BLD AUTO: 0.03 K/UL
BASOPHILS NFR BLD AUTO: 0.6 %
BILIRUB SERPL-MCNC: 0.4 MG/DL
BUN SERPL-MCNC: 20 MG/DL
CALCIUM SERPL-MCNC: 9.7 MG/DL
CHLORIDE SERPL-SCNC: 102 MMOL/L
CHOLEST SERPL-MCNC: 189 MG/DL
CO2 SERPL-SCNC: 28 MMOL/L
COVID-19 NUCLEOCAPSID  GAM ANTIBODY INTERPRETATION: NEGATIVE
COVID-19 SPIKE DOMAIN ANTIBODY INTERPRETATION: POSITIVE
CREAT SERPL-MCNC: 0.83 MG/DL
EOSINOPHIL # BLD AUTO: 0.03 K/UL
EOSINOPHIL NFR BLD AUTO: 0.6 %
ESTIMATED AVERAGE GLUCOSE: 105 MG/DL
GLUCOSE SERPL-MCNC: 94 MG/DL
HBA1C MFR BLD HPLC: 5.3 %
HCT VFR BLD CALC: 38.2 %
HDLC SERPL-MCNC: 70 MG/DL
HGB BLD-MCNC: 12.2 G/DL
IMM GRANULOCYTES NFR BLD AUTO: 0.2 %
LDLC SERPL CALC-MCNC: 99 MG/DL
LYMPHOCYTES # BLD AUTO: 1.1 K/UL
LYMPHOCYTES NFR BLD AUTO: 21.4 %
MAN DIFF?: NORMAL
MCHC RBC-ENTMCNC: 31.4 PG
MCHC RBC-ENTMCNC: 31.9 GM/DL
MCV RBC AUTO: 98.5 FL
MONOCYTES # BLD AUTO: 0.34 K/UL
MONOCYTES NFR BLD AUTO: 6.6 %
NEUTROPHILS # BLD AUTO: 3.64 K/UL
NEUTROPHILS NFR BLD AUTO: 70.6 %
NONHDLC SERPL-MCNC: 119 MG/DL
PLATELET # BLD AUTO: 279 K/UL
POTASSIUM SERPL-SCNC: 5 MMOL/L
PROT SERPL-MCNC: 7.3 G/DL
RBC # BLD: 3.88 M/UL
RBC # FLD: 12 %
SARS-COV-2 AB SERPL IA-ACNC: >250 U/ML
SARS-COV-2 AB SERPL QL IA: 0.07 INDEX
SODIUM SERPL-SCNC: 142 MMOL/L
T4 FREE SERPL-MCNC: 1.3 NG/DL
T4 SERPL-MCNC: 8.5 UG/DL
TRIGL SERPL-MCNC: 101 MG/DL
TSH SERPL-ACNC: 1.94 UIU/ML
VIT B12 SERPL-MCNC: 565 PG/ML
WBC # FLD AUTO: 5.15 K/UL

## 2022-07-15 ENCOUNTER — APPOINTMENT (OUTPATIENT)
Dept: ORTHOPEDIC SURGERY | Facility: CLINIC | Age: 62
End: 2022-07-15

## 2022-07-15 PROCEDURE — 99214 OFFICE O/P EST MOD 30 MIN: CPT

## 2022-07-15 PROCEDURE — 73630 X-RAY EXAM OF FOOT: CPT | Mod: LT

## 2022-07-15 NOTE — ASSESSMENT
[FreeTextEntry1] : Patient has no obvious findings at this time.  She likely had a mild instrinsic muscle strain.  Ice/nsaids and supportive sneakers are recommended.  She was told that if she does not improve over the next 2-3 weeks she will return.

## 2022-07-15 NOTE — PHYSICAL EXAM
[NL (40)] : plantar flexion 40 degrees [NL 30)] : inversion 30 degrees [NL (20)] : eversion 20 degrees [5___] : [facility] 5[unfilled]/5 [2+] : posterior tibialis pulse: 2+ [Normal] : saphenous nerve sensation normal [] : non-antalgic [Left] : left foot [Weight -] : weightbearing [de-identified] : Evidence of prior 5th metatarsal base fracture with only the far lateral/plantar cortex not filled in.  Same as prior.  No acute changes.

## 2022-07-15 NOTE — HISTORY OF PRESENT ILLNESS
[de-identified] : Patient  is a 62 year old female presents for evaluation of her left foot.  She had a prior left 5th metatarsal base fracture which was virtually healed on ct scan back in February, 2022.  She had been doing well until 6/29/22 when she started to have pain.  No trauma, but symptoms started after wearing nonsupportive shoes.  She is able to wb without assistance.

## 2022-08-19 ENCOUNTER — TRANSCRIPTION ENCOUNTER (OUTPATIENT)
Age: 62
End: 2022-08-19

## 2022-08-25 DIAGNOSIS — Z12.11 ENCOUNTER FOR SCREENING FOR MALIGNANT NEOPLASM OF COLON: ICD-10-CM

## 2022-11-04 ENCOUNTER — NON-APPOINTMENT (OUTPATIENT)
Age: 62
End: 2022-11-04

## 2022-12-02 ENCOUNTER — APPOINTMENT (OUTPATIENT)
Dept: INTERNAL MEDICINE | Facility: AMBULATORY MEDICAL SERVICES | Age: 62
End: 2022-12-02

## 2023-01-04 ENCOUNTER — ASOB RESULT (OUTPATIENT)
Age: 63
End: 2023-01-04

## 2023-01-04 ENCOUNTER — APPOINTMENT (OUTPATIENT)
Dept: OBGYN | Facility: CLINIC | Age: 63
End: 2023-01-04
Payer: COMMERCIAL

## 2023-01-04 VITALS
HEIGHT: 64 IN | DIASTOLIC BLOOD PRESSURE: 80 MMHG | SYSTOLIC BLOOD PRESSURE: 134 MMHG | WEIGHT: 100 LBS | BODY MASS INDEX: 17.07 KG/M2

## 2023-01-04 DIAGNOSIS — M81.0 AGE-RELATED OSTEOPOROSIS W/OUT CURRENT PATHOLOGICAL FRACTURE: ICD-10-CM

## 2023-01-04 PROCEDURE — 99396 PREV VISIT EST AGE 40-64: CPT

## 2023-01-04 PROCEDURE — 76830 TRANSVAGINAL US NON-OB: CPT

## 2023-01-10 LAB — HPV HIGH+LOW RISK DNA PNL CVX: NOT DETECTED

## 2023-01-13 LAB — CYTOLOGY CVX/VAG DOC THIN PREP: ABNORMAL

## 2023-01-18 ENCOUNTER — NON-APPOINTMENT (OUTPATIENT)
Age: 63
End: 2023-01-18

## 2023-02-03 ENCOUNTER — APPOINTMENT (OUTPATIENT)
Dept: OTOLARYNGOLOGY | Facility: CLINIC | Age: 63
End: 2023-02-03
Payer: COMMERCIAL

## 2023-02-03 VITALS
WEIGHT: 100 LBS | HEART RATE: 62 BPM | SYSTOLIC BLOOD PRESSURE: 132 MMHG | BODY MASS INDEX: 17.07 KG/M2 | HEIGHT: 64 IN | DIASTOLIC BLOOD PRESSURE: 84 MMHG

## 2023-02-03 DIAGNOSIS — J34.89 OTHER SPECIFIED DISORDERS OF NOSE AND NASAL SINUSES: ICD-10-CM

## 2023-02-03 PROCEDURE — 99214 OFFICE O/P EST MOD 30 MIN: CPT | Mod: 25

## 2023-02-03 PROCEDURE — 31231 NASAL ENDOSCOPY DX: CPT

## 2023-02-03 RX ORDER — METHYLPREDNISOLONE 4 MG/1
4 TABLET ORAL
Qty: 1 | Refills: 0 | Status: ACTIVE | COMMUNITY
Start: 2023-02-03 | End: 1900-01-01

## 2023-02-03 RX ORDER — AZELASTINE HYDROCHLORIDE 137 UG/1
0.1 SPRAY, METERED NASAL TWICE DAILY
Qty: 1 | Refills: 2 | Status: ACTIVE | COMMUNITY
Start: 2023-02-03 | End: 1900-01-01

## 2023-02-03 NOTE — PROCEDURE
[FreeTextEntry6] : reason for exam: anterior rhinoscopy insufficient for symptom evaluation \par \par Fiberoptic nasal endoscopy was performed.  R/b/a of procedure was explained to the patient and they agreed to proceed with procedure.   B/l inferior turbinate hypertrophy, severe with edematous mucosa.  Remainder of exam normal, including b/l middle turbinates, superior turbinates, inferior, middle and superior meati and sphenoethmoidal recess.  No nasal polyps.   SEPTAL DORSAL DEVIATION TO THE LEFT AND FLOOR TO THE RIGHT WITH CRUSTING ALONG THE FLOOR ON THE RIGHT COPIOUS CLEAR SECRETIONS \par \par \par scope #: 214\par \par  [de-identified] : reason for laryngoscopy: unable to cooperate with mirror \par \par Fiberoptic laryngoscopy was performed on the patient.  R/b/a was explained to the patient and they agreed to proceed with procedure.  Nasal cavities were treated with lidocaine and afrin prior to laryngoscopy for comfort.  Nasal cavities: clear b/l, Nasopharynx: mild erythema and swelling, Oropharynx/Hypopharynx: + lingual tonsillar hypertrophy no masses or lesions, posterior pharyngeal wall with cobblestoning.  No BOT hypertrophy, vallecula is clear of any masses or cysts, Supraglottis: epiglottis sharp with normal bilateral arytenoids, aryepiglottic folds, ventricles but with + interarytenoid edema consistent with reflux, Glottis: clear, TVCs mobile b/l.  Subglottis clear  No pooling of secretions in the pyriform sinus.  Airway patent\par \par Scope #: 214\par \par \par

## 2023-02-03 NOTE — CONSULT LETTER
[Dear  ___] : Dear  [unfilled], [Consult Letter:] : I had the pleasure of evaluating your patient, [unfilled]. [Please see my note below.] : Please see my note below. [Consult Closing:] : Thank you very much for allowing me to participate in the care of this patient.  If you have any questions, please do not hesitate to contact me. [FreeTextEntry2] : [address] [facility] [address] [FreeTextEntry3] : Sincerely, \par \par Alva Young MD\par Otolaryngology- Facial Plastics \par [address] [facility] [facility] [address]\[place] [address]\par (P) - [phone]\par (F) - [phone]

## 2023-02-03 NOTE — PHYSICAL EXAM
[Nasal Endoscopy Performed] : nasal endoscopy was performed, see procedure section for findings [Normal] : mucosa is normal [Midline] : trachea located in midline position [de-identified] : ++ hypertrophy  [de-identified] : clear secretions [Laryngoscopy Performed] : laryngoscopy was performed, see procedure section for findings

## 2023-02-03 NOTE — HISTORY OF PRESENT ILLNESS
[de-identified] : Patient is a 62 year old female who presented initially with right sided ear fullness and throat pain with intermittent episodes of food getting stuck behind her tonsils. Patient says she was unable to pop her right ear for several months.  She denied change in hearing, tinnitus, and otorrhea.  No h/o BMT, no known h/o ear infections.  No autophony. \par She also noted that she has intermittent right sided throat irritation, Patient says she used a chopstick to scoop out food from behind her tonsil and sometimes irritates her throat with the chopstick. She denies difficulty swallowing or drinking. No h/o throat infections. At last visit was given flonase and used it for several months until better then stopped.  [FreeTextEntry1] : 2-3 months ago pt was sick with cough, congestion given PO antibiotics got better followed by Covid. 1/6/23 pt had temp 103 cough, green phlegm, R nasal crusting/bleeding, pt saw PCP and received IM antibiotics, s/p Cefdinir/Zpack completed 10 days ago now 90% better,  still with R sided nasal crusting / congestion and hoarseness,  R throat pain

## 2023-02-03 NOTE — END OF VISIT
[FreeTextEntry3] : I personally saw and examined MARCY STALLINGS in detail.  I spoke to NATHALIE Wadsworth regarding the assessment and plan of care. I performed the procedures and relevant physical exam.  I have reviewed the above assessment and plan of care and I agree.  I have made changes to the body of the note wherever necessary and appropriate.\par

## 2023-02-03 NOTE — ASSESSMENT
[FreeTextEntry1] : Ms. STALLINGS is a 62 year female s/p Cefdinir and Zpack for Acute sinusitis now with R side nasal crusting and congestion and hoarseness.\par \par - Rx Medrol dose pack\par - start Chas med sinus rinse daily for 1 month\par - continue Flonase use daily\par - add Azelastine nasal spray at night \par - educated on appropriate use including daily use and spraying left nostril with right hand and vice versa\par - send consult letter to  Dr iEtan Holm [address] [place] [id number]\par \par f/u 4-6 weeks\par

## 2023-03-02 ENCOUNTER — APPOINTMENT (OUTPATIENT)
Dept: INTERNAL MEDICINE | Facility: CLINIC | Age: 63
End: 2023-03-02

## 2023-03-07 ENCOUNTER — APPOINTMENT (OUTPATIENT)
Dept: INTERNAL MEDICINE | Facility: CLINIC | Age: 63
End: 2023-03-07

## 2023-03-07 ENCOUNTER — RESULT CHARGE (OUTPATIENT)
Age: 63
End: 2023-03-07

## 2023-03-08 ENCOUNTER — NON-APPOINTMENT (OUTPATIENT)
Age: 63
End: 2023-03-08

## 2023-03-08 ENCOUNTER — APPOINTMENT (OUTPATIENT)
Dept: INTERNAL MEDICINE | Facility: CLINIC | Age: 63
End: 2023-03-08
Payer: COMMERCIAL

## 2023-03-08 VITALS
HEIGHT: 64 IN | HEART RATE: 60 BPM | TEMPERATURE: 97 F | DIASTOLIC BLOOD PRESSURE: 62 MMHG | OXYGEN SATURATION: 98 % | BODY MASS INDEX: 17.07 KG/M2 | SYSTOLIC BLOOD PRESSURE: 110 MMHG | WEIGHT: 100 LBS

## 2023-03-08 DIAGNOSIS — E07.9 DISORDER OF THYROID, UNSPECIFIED: ICD-10-CM

## 2023-03-08 DIAGNOSIS — Z11.59 ENCOUNTER FOR SCREENING FOR OTHER VIRAL DISEASES: ICD-10-CM

## 2023-03-08 DIAGNOSIS — E55.9 VITAMIN D DEFICIENCY, UNSPECIFIED: ICD-10-CM

## 2023-03-08 PROCEDURE — 99396 PREV VISIT EST AGE 40-64: CPT | Mod: 25

## 2023-03-08 PROCEDURE — 36415 COLL VENOUS BLD VENIPUNCTURE: CPT

## 2023-03-08 PROCEDURE — 93000 ELECTROCARDIOGRAM COMPLETE: CPT

## 2023-03-08 PROCEDURE — 81003 URINALYSIS AUTO W/O SCOPE: CPT | Mod: QW

## 2023-03-08 RX ORDER — PANTOPRAZOLE SODIUM 40 MG/10ML
40 INJECTION, POWDER, FOR SOLUTION INTRAVENOUS
Refills: 0 | Status: ACTIVE | COMMUNITY

## 2023-03-08 RX ORDER — SODIUM PICOSULFATE, MAGNESIUM OXIDE, AND ANHYDROUS CITRIC ACID 10; 3.5; 12 MG/160ML; G/160ML; G/160ML
10-3.5-12 MG-GM LIQUID ORAL
Qty: 1 | Refills: 0 | Status: DISCONTINUED | COMMUNITY
Start: 2022-08-25 | End: 2023-03-08

## 2023-03-08 RX ORDER — ESZOPICLONE 2 MG/1
2 TABLET, FILM COATED ORAL
Refills: 0 | Status: ACTIVE | COMMUNITY

## 2023-03-08 NOTE — HISTORY OF PRESENT ILLNESS
[FreeTextEntry1] : Here to f/u medical conditions\par Had EGD and colon by Dr. Tuan Lyons since ECLI not on plan and is on pantoprazole\par  on Lunesta from another doctor\par  s/p bilateral mastectomy had early breast cancer. \par  had latest booster\par

## 2023-03-08 NOTE — HEALTH RISK ASSESSMENT
[Good] : ~his/her~  mood as  good [Yes] : Yes [Monthly or less (1 pt)] : Monthly or less (1 point) [1 or 2 (0 pts)] : 1 or 2 (0 points) [Never (0 pts)] : Never (0 points) [Patient reported PAP Smear was normal] : Patient reported PAP Smear was normal [Patient reported bone density results were abnormal] : Patient reported bone density results were abnormal [HIV test declined] : HIV test declined [Hepatitis C test declined] : Hepatitis C test declined [Change in mental status noted] : Change in mental status noted [None] : None [Employed] : employed [] :  [# Of Children ___] : has [unfilled] children [Sexually Active] : sexually active [Feels Safe at Home] : Feels safe at home [Fully functional (bathing, dressing, toileting, transferring, walking, feeding)] : Fully functional (bathing, dressing, toileting, transferring, walking, feeding) [Fully functional (using the telephone, shopping, preparing meals, housekeeping, doing laundry, using] : Fully functional and needs no help or supervision to perform IADLs (using the telephone, shopping, preparing meals, housekeeping, doing laundry, using transportation, managing medications and managing finances) [Smoke Detector] : smoke detector [Carbon Monoxide Detector] : carbon monoxide detector [Seat Belt] :  uses seat belt [Sunscreen] : uses sunscreen [Never] : Never [Audit-CScore] : 1 [Reports changes in hearing] : Reports no changes in hearing [Reports changes in vision] : Reports no changes in vision [Reports changes in dental health] : Reports no changes in dental health [PapSmearDate] : 2023 [BoneDensityDate] : 2023 [BoneDensityComments] : on prolia [ColonoscopyDate] : 1/203 [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

## 2023-03-10 ENCOUNTER — APPOINTMENT (OUTPATIENT)
Dept: OTOLARYNGOLOGY | Facility: CLINIC | Age: 63
End: 2023-03-10
Payer: COMMERCIAL

## 2023-03-10 VITALS
HEART RATE: 61 BPM | HEIGHT: 64 IN | DIASTOLIC BLOOD PRESSURE: 77 MMHG | WEIGHT: 100 LBS | BODY MASS INDEX: 17.07 KG/M2 | OXYGEN SATURATION: 100 % | SYSTOLIC BLOOD PRESSURE: 115 MMHG

## 2023-03-10 PROCEDURE — 31231 NASAL ENDOSCOPY DX: CPT

## 2023-03-10 PROCEDURE — 92557 COMPREHENSIVE HEARING TEST: CPT

## 2023-03-10 PROCEDURE — 92567 TYMPANOMETRY: CPT

## 2023-03-10 PROCEDURE — 99214 OFFICE O/P EST MOD 30 MIN: CPT | Mod: 25

## 2023-03-10 NOTE — PHYSICAL EXAM
[Normal] : mucosa is normal [Midline] : trachea located in midline position [de-identified] : thin layer of cerumen R side /  l wnl [Nasal Endoscopy Performed] : nasal endoscopy was performed, see procedure section for findings [Laryngoscopy Performed] : laryngoscopy was performed, see procedure section for findings

## 2023-03-10 NOTE — CONSULT LETTER
[Dear  ___] : Dear  [unfilled], [Consult Letter:] : I had the pleasure of evaluating your patient, [unfilled]. [Please see my note below.] : Please see my note below. [Consult Closing:] : Thank you very much for allowing me to participate in the care of this patient.  If you have any questions, please do not hesitate to contact me. [FreeTextEntry3] : Sincerely, \par \par Alva Young MD\par Otolaryngology- Facial Plastics \par [address] [facility] [address]\[place] [address]\par (P) - [phone]\par (F) - [phone]

## 2023-03-10 NOTE — PROCEDURE
[FreeTextEntry6] : reason for exam: anterior rhinoscopy insufficient for symptom evaluation \par \par Fiberoptic nasal endoscopy was performed.  R/b/a of procedure was explained to the patient and they agreed to proceed with procedure.  B/l inferior turbinate hypertrophy, moderate with edematous mucosa.  Remainder of exam normal, including b/l middle turbinates, superior turbinates, inferior, middle and superior meati and sphenoethmoidal recess.  No nasal polyps.  Septum midline, COPIOUS CLEAR NASAL DISCHARGE\par \par scope #: 211 [de-identified] : reason for laryngoscopy: unable to cooperate with mirror\par \par Fiberoptic laryngoscopy was performed.  R/b/a of procedure was explained to the patient and they agreed to proceed with procedure.  Nasal cavities were treated with afrin and lidocaine prior to laryngoscopy to make the patient more comfortable.  NC: normal b/l, NP: clear no NP mass or lesions, no adenoid hypertrophy.  Oropharynx/Hypopharynx clear no masses or lesions, posterior pharyngeal wall clear.  No BOT hypertrophy, vallecula is clear of any masses or cysts.  Supraglottis clear no evidence of masses, lesions or reflux, epiglottis sharp with normal bilateral arytenoids, aryepiglottic folds, ventricles.  Glottis clear, TVCs mobile b/l, subglottis clear.  No pooling of secretions in the pyriform sinus.  \par \par Scope #: 211\par

## 2023-03-10 NOTE — ASSESSMENT
[FreeTextEntry1] : Ms. STALLINGS is a 62 year female s/p treatment for sinusitis now doing better, still with hoarseness and now R ear muffling x 1 week. On exam she does have a thin layer of wax which was removed R side.  On scope she does still have PND, reflux looks much better today\par \par - continue Flonase use daily AND  Azelastine nasal spray at night \par - F/up 3 months\par - audio wnl, muffled sensation likely eustachian tube dysfunction, c/w sprays as above\par \par \par - send consult letter to  Dr Eitan Holm [address] [facility] [address]\par \par \par

## 2023-04-07 ENCOUNTER — APPOINTMENT (OUTPATIENT)
Dept: ORTHOPEDIC SURGERY | Facility: CLINIC | Age: 63
End: 2023-04-07
Payer: MEDICARE

## 2023-04-07 VITALS — HEIGHT: 64 IN | WEIGHT: 100 LBS | BODY MASS INDEX: 17.07 KG/M2

## 2023-04-07 DIAGNOSIS — S96.912A STRAIN OF UNSPECIFIED MUSCLE AND TENDON AT ANKLE AND FOOT LEVEL, LEFT FOOT, INITIAL ENCOUNTER: ICD-10-CM

## 2023-04-07 PROCEDURE — 99214 OFFICE O/P EST MOD 30 MIN: CPT

## 2023-04-07 PROCEDURE — 73630 X-RAY EXAM OF FOOT: CPT | Mod: LT

## 2023-04-07 NOTE — PHYSICAL EXAM
[NL (40)] : plantar flexion 40 degrees [NL 30)] : inversion 30 degrees [NL (20)] : eversion 20 degrees [5___] : [facility] 5[unfilled]/5 [2+] : posterior tibialis pulse: 2+ [Normal] : saphenous nerve sensation normal [] : patient ambulates without assistive device [Left] : left foot [Weight -] : weightbearing [There are no fractures, subluxations or dislocations. No significant abnormalities are seen] : There are no fractures, subluxations or dislocations. No significant abnormalities are seen [de-identified] : Hallux valgus.  Old/healed 5th metatarsal base fracture.  Xrays unchanged from prior.

## 2023-04-07 NOTE — ASSESSMENT
[FreeTextEntry1] : Patient has no findings at this time.\par Supportive shoes, icing and prn nsaids recommended.\par She will refrain from exercises until she feels better.\par If she does not get better over the next couple of weeks, she will return.

## 2023-04-07 NOTE — HISTORY OF PRESENT ILLNESS
[5] : 5 [6] : 6 [Dull/Aching] : dull/aching [Throbbing] : throbbing [Sleep] : sleep [Ice] : ice [Walking] : walking [de-identified] : Patient is a 62 year old female who presents today for evaluation of her left foot. Pt states that she went for a walk on 4/04/23 and started developing pain.  No trauma.  She is able to wb without assistance. [] : Post Surgical Visit: no [FreeTextEntry1] : L foot

## 2023-04-18 ENCOUNTER — FORM ENCOUNTER (OUTPATIENT)
Age: 63
End: 2023-04-18

## 2023-04-19 ENCOUNTER — APPOINTMENT (OUTPATIENT)
Dept: MRI IMAGING | Facility: CLINIC | Age: 63
End: 2023-04-19
Payer: MEDICARE

## 2023-04-19 PROCEDURE — 73718 MRI LOWER EXTREMITY W/O DYE: CPT | Mod: LT,MH

## 2023-04-24 ENCOUNTER — APPOINTMENT (OUTPATIENT)
Dept: ORTHOPEDIC SURGERY | Facility: CLINIC | Age: 63
End: 2023-04-24
Payer: MEDICARE

## 2023-04-24 VITALS — BODY MASS INDEX: 17.07 KG/M2 | WEIGHT: 100 LBS | HEIGHT: 64 IN

## 2023-04-24 DIAGNOSIS — S96.912D STRAIN OF UNSPECIFIED MUSCLE AND TENDON AT ANKLE AND FOOT LEVEL, LEFT FOOT, SUBSEQUENT ENCOUNTER: ICD-10-CM

## 2023-04-24 PROCEDURE — 99214 OFFICE O/P EST MOD 30 MIN: CPT

## 2023-04-24 NOTE — DATA REVIEWED
[MRI] : MRI [Left] : left [Report was reviewed and noted in the chart] : The report was reviewed and noted in the chart [Foot] : foot [I independently reviewed and interpreted images and report] : I independently reviewed and interpreted images and report [FreeTextEntry1] : 1. Similar linear appearing low signal intensity corresponding to sclerosis associated with suspected healed \par fracture at the base of the fifth metatarsal without marrow edema, surrounding soft tissue swelling or muscle \par strain.\par 2. Similar appearing chronic appearing arthrosis with hallux valgus deformity at the first MTP.\par 3. No Lisfranc ligament tear, malalignment, muscle tear or tendon tear in the midfoot.

## 2023-04-24 NOTE — HISTORY OF PRESENT ILLNESS
[5] : 5 [6] : 6 [Dull/Aching] : dull/aching [Throbbing] : throbbing [Sleep] : sleep [Ice] : ice [Walking] : walking [de-identified] : Patient returns for follow up of  her left foot. She reports still having intermittent, mild,  lateral sided pain.  No trauma.  She is able to wb without assistance. MRI left foot showed no signs of a fracture or other pathology. [] : Post Surgical Visit: no [FreeTextEntry1] : L foot [de-identified] : MRI [de-identified] : MRI

## 2023-04-24 NOTE — PHYSICAL EXAM
[NL (40)] : plantar flexion 40 degrees [NL 30)] : inversion 30 degrees [NL (20)] : eversion 20 degrees [5___] : [facility] 5[unfilled]/5 [2+] : posterior tibialis pulse: 2+ [Normal] : saphenous nerve sensation normal [Left] : left foot [Weight -] : weightbearing [There are no fractures, subluxations or dislocations. No significant abnormalities are seen] : There are no fractures, subluxations or dislocations. No significant abnormalities are seen [de-identified] : Hallux valgus.  Old/healed 5th metatarsal base fracture.  Xrays unchanged from prior. [] : non-antalgic

## 2023-04-24 NOTE — ASSESSMENT
[FreeTextEntry1] : Patient has no findings at this time.\par Supportive shoes, icing and prn nsaids recommended.\par She can do activities as tolerated.

## 2023-04-25 LAB
25(OH)D3 SERPL-MCNC: 58.3 NG/ML
ALBUMIN SERPL ELPH-MCNC: 4.8 G/DL
ALP BLD-CCNC: 52 U/L
ALT SERPL-CCNC: 22 U/L
ANION GAP SERPL CALC-SCNC: 14 MMOL/L
AST SERPL-CCNC: 15 U/L
BASOPHILS # BLD AUTO: 0.03 K/UL
BASOPHILS NFR BLD AUTO: 0.4 %
BILIRUB SERPL-MCNC: 0.5 MG/DL
BUN SERPL-MCNC: 19 MG/DL
CALCIUM SERPL-MCNC: 9.6 MG/DL
CHLORIDE SERPL-SCNC: 101 MMOL/L
CHOLEST SERPL-MCNC: 216 MG/DL
CO2 SERPL-SCNC: 26 MMOL/L
COVID-19 NUCLEOCAPSID  GAM ANTIBODY INTERPRETATION: POSITIVE
COVID-19 SPIKE DOMAIN ANTIBODY INTERPRETATION: POSITIVE
CREAT SERPL-MCNC: 0.81 MG/DL
EGFR: 81 ML/MIN/1.73M2
EOSINOPHIL # BLD AUTO: 0 K/UL
EOSINOPHIL NFR BLD AUTO: 0 %
ESTIMATED AVERAGE GLUCOSE: 111 MG/DL
GLUCOSE SERPL-MCNC: 98 MG/DL
HBA1C MFR BLD HPLC: 5.5 %
HCT VFR BLD CALC: 39.7 %
HDLC SERPL-MCNC: 78 MG/DL
HGB BLD-MCNC: 12.2 G/DL
IMM GRANULOCYTES NFR BLD AUTO: 1.5 %
LDLC SERPL CALC-MCNC: 119 MG/DL
LYMPHOCYTES # BLD AUTO: 1.06 K/UL
LYMPHOCYTES NFR BLD AUTO: 13.5 %
MAN DIFF?: NORMAL
MCHC RBC-ENTMCNC: 29.9 PG
MCHC RBC-ENTMCNC: 30.7 GM/DL
MCV RBC AUTO: 97.3 FL
MONOCYTES # BLD AUTO: 0.42 K/UL
MONOCYTES NFR BLD AUTO: 5.3 %
NEUTROPHILS # BLD AUTO: 6.23 K/UL
NEUTROPHILS NFR BLD AUTO: 79.3 %
NONHDLC SERPL-MCNC: 138 MG/DL
PLATELET # BLD AUTO: 397 K/UL
POTASSIUM SERPL-SCNC: 3.7 MMOL/L
PROT SERPL-MCNC: 6.9 G/DL
PSA SERPL-MCNC: <0.01 NG/ML
RBC # BLD: 4.08 M/UL
RBC # FLD: 13.7 %
SARS-COV-2 AB SERPL IA-ACNC: >250 U/ML
SARS-COV-2 AB SERPL QL IA: 40.6 INDEX
SODIUM SERPL-SCNC: 141 MMOL/L
T4 FREE SERPL-MCNC: 1.4 NG/DL
T4 SERPL-MCNC: 8.5 UG/DL
TRIGL SERPL-MCNC: 96 MG/DL
TSH SERPL-ACNC: 0.91 UIU/ML
VIT B12 SERPL-MCNC: 535 PG/ML
WBC # FLD AUTO: 7.86 K/UL

## 2023-06-02 ENCOUNTER — APPOINTMENT (OUTPATIENT)
Dept: OTOLARYNGOLOGY | Facility: CLINIC | Age: 63
End: 2023-06-02
Payer: MEDICARE

## 2023-06-02 VITALS
WEIGHT: 100 LBS | BODY MASS INDEX: 17.07 KG/M2 | SYSTOLIC BLOOD PRESSURE: 127 MMHG | DIASTOLIC BLOOD PRESSURE: 85 MMHG | HEIGHT: 64 IN | OXYGEN SATURATION: 100 % | HEART RATE: 62 BPM

## 2023-06-02 PROCEDURE — 99213 OFFICE O/P EST LOW 20 MIN: CPT | Mod: 25

## 2023-06-02 PROCEDURE — 31231 NASAL ENDOSCOPY DX: CPT

## 2023-06-02 NOTE — ASSESSMENT
[FreeTextEntry1] : Ms. STALLINGS is a 63 year female s/p treatment for sinusitis now doing much better, ear muffling has resolved.   On scope she does still have some crusting R middle turbinate\par \par - would c/w Flonase through the summer, take a break through the fall and start again end of winter in prep for allergy season,  can switch to Azelastine as needed, would \par - c/w sinus rinse as needed\par - c/w PPI as she does still have voice fatigue and has been on for years\par - prior audio 3/10/23 wnl\par - f/u 6-8 months\par \par \par \par \par

## 2023-06-02 NOTE — PHYSICAL EXAM
[Nasal Endoscopy Performed] : nasal endoscopy was performed, see procedure section for findings [] : septum deviated to the left [Normal] : inferior turbinates and middle turbinates are normal [de-identified] : scant crusting

## 2023-06-02 NOTE — PROCEDURE
[FreeTextEntry6] : reason for exam: anterior rhinoscopy insufficient for symptom evaluation \par \par Fiberoptic nasal endoscopy was performed.  R/b/a of procedure was explained to the patient and they agreed to proceed with procedure.  .  Significant factors noted: CRUSTING RIGHT MIDDLE TURBINATE Otherwise normal mucosa, normal b/l inferior, middle and superior turbinates, inferior, middle and superior meati and sphenoethmoidal recess.  No nasal polyps.  SEPTUM DEVIATED LEFT\par \par scope #: 210\par

## 2023-07-21 ENCOUNTER — APPOINTMENT (OUTPATIENT)
Dept: ORTHOPEDIC SURGERY | Facility: CLINIC | Age: 63
End: 2023-07-21
Payer: MEDICARE

## 2023-07-21 VITALS — WEIGHT: 100 LBS | HEIGHT: 64 IN | BODY MASS INDEX: 17.07 KG/M2

## 2023-07-21 DIAGNOSIS — M77.51 OTHER ENTHESOPATHY OF RT FOOT AND ANKLE: ICD-10-CM

## 2023-07-21 PROCEDURE — 73630 X-RAY EXAM OF FOOT: CPT | Mod: RT

## 2023-07-21 PROCEDURE — 99213 OFFICE O/P EST LOW 20 MIN: CPT

## 2023-07-21 NOTE — PHYSICAL EXAM
[NL (40)] : plantar flexion 40 degrees [NL 30)] : inversion 30 degrees [NL (20)] : eversion 20 degrees [5___] : eversion 5[unfilled]/5 [2+] : posterior tibialis pulse: 2+ [Normal] : saphenous nerve sensation normal [] : non-antalgic [Right] : right foot [Weight -] : weightbearing [There are no fractures, subluxations or dislocations. No significant abnormalities are seen] : There are no fractures, subluxations or dislocations. No significant abnormalities are seen [de-identified] : Hardware in tibia and fibula consistent with previous ORIF.

## 2023-07-21 NOTE — HISTORY OF PRESENT ILLNESS
[5] : 5 [1] : 2 [de-identified] : MARCY STALLINGS a 63 year old female here for evaluation of right foot. Denies trauma. Symptoms since mid-July 2023. Pain worse with flip flops. Symptoms chiefly lateral foot. H/O ORIF RT ankle about 12 years ago. NSAIDS prn. WB in sneakers today. No real pain today.  [FreeTextEntry1] : right foot

## 2023-07-21 NOTE — ASSESSMENT
[FreeTextEntry1] : Patient has recovered well, and can slowly resume activities as tolerated.  Home stretching exercises were encouraged to maintain flexibility. Ice/nsaids can be used as needed. Patient will follow up as needed.\par

## 2023-11-14 ENCOUNTER — NON-APPOINTMENT (OUTPATIENT)
Age: 63
End: 2023-11-14

## 2024-01-16 ENCOUNTER — APPOINTMENT (OUTPATIENT)
Dept: OBGYN | Facility: CLINIC | Age: 64
End: 2024-01-16

## 2024-03-13 ENCOUNTER — APPOINTMENT (OUTPATIENT)
Dept: INTERNAL MEDICINE | Facility: CLINIC | Age: 64
End: 2024-03-13
Payer: MEDICARE

## 2024-03-13 ENCOUNTER — NON-APPOINTMENT (OUTPATIENT)
Age: 64
End: 2024-03-13

## 2024-03-13 VITALS
HEART RATE: 60 BPM | HEIGHT: 64 IN | SYSTOLIC BLOOD PRESSURE: 102 MMHG | WEIGHT: 100 LBS | OXYGEN SATURATION: 98 % | BODY MASS INDEX: 17.07 KG/M2 | DIASTOLIC BLOOD PRESSURE: 74 MMHG

## 2024-03-13 DIAGNOSIS — E53.8 DEFICIENCY OF OTHER SPECIFIED B GROUP VITAMINS: ICD-10-CM

## 2024-03-13 DIAGNOSIS — R00.2 PALPITATIONS: ICD-10-CM

## 2024-03-13 DIAGNOSIS — Z00.00 ENCOUNTER FOR GENERAL ADULT MEDICAL EXAMINATION W/OUT ABNORMAL FINDINGS: ICD-10-CM

## 2024-03-13 DIAGNOSIS — E78.00 PURE HYPERCHOLESTEROLEMIA, UNSPECIFIED: ICD-10-CM

## 2024-03-13 DIAGNOSIS — R73.9 HYPERGLYCEMIA, UNSPECIFIED: ICD-10-CM

## 2024-03-13 LAB
BILIRUB UR QL STRIP: NEGATIVE
CLARITY UR: CLEAR
COLLECTION METHOD: NORMAL
GLUCOSE UR-MCNC: NEGATIVE
HCG UR QL: 0.2 EU/DL
HGB UR QL STRIP.AUTO: NEGATIVE
KETONES UR-MCNC: NEGATIVE
LEUKOCYTE ESTERASE UR QL STRIP: NEGATIVE
NITRITE UR QL STRIP: NEGATIVE
PH UR STRIP: 6
PROT UR STRIP-MCNC: NEGATIVE
SP GR UR STRIP: 1.02

## 2024-03-13 PROCEDURE — G0402 INITIAL PREVENTIVE EXAM: CPT

## 2024-03-13 PROCEDURE — G0403: CPT

## 2024-03-13 PROCEDURE — 36415 COLL VENOUS BLD VENIPUNCTURE: CPT

## 2024-03-13 PROCEDURE — 81003 URINALYSIS AUTO W/O SCOPE: CPT | Mod: QW

## 2024-03-13 NOTE — HISTORY OF PRESENT ILLNESS
[FreeTextEntry1] : Here to f/u medical conditions Had EGD and colon by Dr. Tuan Lyons since ECLI not on plan and is on pantoprazole  on Lunesta from another doctor- Dr. Holm  s/p bilateral mastectomy had early breast cancer. Sees breast doctor regularly.  had latest booster  Gets Prolia by Dr Chasity Whelan endocrine.   S/p stress fracture metatarsal left foot. yesterday first day weight bearing - wearing boot.  Dr. Soto

## 2024-03-13 NOTE — PHYSICAL EXAM
[No Acute Distress] : no acute distress [Well Nourished] : well nourished [Well Developed] : well developed [Normal Sclera/Conjunctiva] : normal sclera/conjunctiva [Well-Appearing] : well-appearing [PERRL] : pupils equal round and reactive to light [EOMI] : extraocular movements intact [Normal Outer Ear/Nose] : the outer ears and nose were normal in appearance [No JVD] : no jugular venous distention [Normal Oropharynx] : the oropharynx was normal [No Lymphadenopathy] : no lymphadenopathy [Supple] : supple [Thyroid Normal, No Nodules] : the thyroid was normal and there were no nodules present [No Respiratory Distress] : no respiratory distress  [No Accessory Muscle Use] : no accessory muscle use [Clear to Auscultation] : lungs were clear to auscultation bilaterally [Normal Rate] : normal rate  [Normal S1, S2] : normal S1 and S2 [Regular Rhythm] : with a regular rhythm [No Murmur] : no murmur heard [No Carotid Bruits] : no carotid bruits [No Abdominal Bruit] : a ~M bruit was not heard ~T in the abdomen [No Varicosities] : no varicosities [Pedal Pulses Present] : the pedal pulses are present [No Edema] : there was no peripheral edema [No Extremity Clubbing/Cyanosis] : no extremity clubbing/cyanosis [No Palpable Aorta] : no palpable aorta [Soft] : abdomen soft [Non Tender] : non-tender [No Masses] : no abdominal mass palpated [Non-distended] : non-distended [Normal Bowel Sounds] : normal bowel sounds [No HSM] : no HSM [Normal Posterior Cervical Nodes] : no posterior cervical lymphadenopathy [Normal Anterior Cervical Nodes] : no anterior cervical lymphadenopathy [No Spinal Tenderness] : no spinal tenderness [No CVA Tenderness] : no CVA  tenderness [No Joint Swelling] : no joint swelling [No Rash] : no rash [Grossly Normal Strength/Tone] : grossly normal strength/tone [No Focal Deficits] : no focal deficits [Coordination Grossly Intact] : coordination grossly intact [Deep Tendon Reflexes (DTR)] : deep tendon reflexes were 2+ and symmetric [Normal Gait] : normal gait [Normal Insight/Judgement] : insight and judgment were intact [Normal Affect] : the affect was normal

## 2024-05-10 ENCOUNTER — APPOINTMENT (OUTPATIENT)
Dept: OTOLARYNGOLOGY | Facility: CLINIC | Age: 64
End: 2024-05-10
Payer: MEDICARE

## 2024-05-10 VITALS — BODY MASS INDEX: 17.07 KG/M2 | HEART RATE: 63 BPM | HEIGHT: 64 IN | WEIGHT: 100 LBS | OXYGEN SATURATION: 100 %

## 2024-05-10 VITALS
OXYGEN SATURATION: 100 % | BODY MASS INDEX: 17.07 KG/M2 | HEART RATE: 68 BPM | HEIGHT: 64 IN | SYSTOLIC BLOOD PRESSURE: 136 MMHG | WEIGHT: 100 LBS | DIASTOLIC BLOOD PRESSURE: 75 MMHG

## 2024-05-10 DIAGNOSIS — R09.82 POSTNASAL DRIP: ICD-10-CM

## 2024-05-10 DIAGNOSIS — R49.0 DYSPHONIA: ICD-10-CM

## 2024-05-10 DIAGNOSIS — R09.81 NASAL CONGESTION: ICD-10-CM

## 2024-05-10 DIAGNOSIS — K21.9 GASTRO-ESOPHAGEAL REFLUX DISEASE W/OUT ESOPHAGITIS: ICD-10-CM

## 2024-05-10 DIAGNOSIS — E04.1 NONTOXIC SINGLE THYROID NODULE: ICD-10-CM

## 2024-05-10 DIAGNOSIS — H93.8X9 OTHER SPECIFIED DISORDERS OF EAR, UNSPECIFIED EAR: ICD-10-CM

## 2024-05-10 PROCEDURE — 31231 NASAL ENDOSCOPY DX: CPT

## 2024-05-10 PROCEDURE — 99213 OFFICE O/P EST LOW 20 MIN: CPT | Mod: 25

## 2024-05-10 NOTE — HISTORY OF PRESENT ILLNESS
[de-identified] : Patient is a 64 year old female previously seen for sinus infection. Has them intermittently, usually require 2 abx to clear (augmentin and doxy) [FreeTextEntry1] : pt here for f/u referred by Endo s/p biopsy thyroid nodule which was negative, she has noticed hoarseness and would like to check the vocal cords for compression.  still taking Pantoprazole 40 mg daily for many years recently treated for sinus infection with 2 rounds of antibiotics, Augmentin and Doxycycline completed in April, now decreased sinus rinse use to every other day, still with Flonase daily x 3 months

## 2024-05-10 NOTE — ASSESSMENT
[FreeTextEntry1] : Ms. STALLINGS is a 64 year female s/p treatment for sinusitis now doing much better on sinus rinse and Flonase x 2 months    Acute sinusitis: scope shows this infection has cleared, OMC clear, bilateral inferior turbinate hypertrophy - c/w sinus rinse a few times per week and would  - would c/w Flonase daily through the summer  Hoarseness/ Thyroid nodule: scope shows b/l VC moving well, no masses, no evidence of compression - c/w PPI as she does still have voice fatigue and has been on for years - pt is followed by Endocrine Citlalli Whelan, recent thyroid biopsy reported as negative  - prior audio 3/10/23 wnl - f/u 6-8 months to evaluate nasal regiment

## 2024-05-10 NOTE — END OF VISIT
[FreeTextEntry3] : I personally saw and examined MARCY STALLINGS  in detail. I spoke to NATHALIE Wadsworth regarding the assessment and plan of care. I performed the procedures and relevant physical exam. I have made changes to the body of the note wherever necessary and appropriate.

## 2024-05-10 NOTE — PHYSICAL EXAM
[Nasal Endoscopy Performed] : nasal endoscopy was performed, see procedure section for findings [] : septum deviated to the left [Normal] : mucosa is normal [Midline] : trachea located in midline position [Laryngoscopy Performed] : laryngoscopy was performed, see procedure section for findings

## 2024-05-10 NOTE — PROCEDURE
[FreeTextEntry6] : reason for exam: anterior rhinoscopy insufficient for symptom evaluation     Fiberoptic nasal endoscopy was performed.  R/b/a of procedure was explained to the patient and they agreed to proceed with procedure. B/l inferior turbinate hypertrophy, severe with edematous mucosa.  Remainder of exam normal, including b/l middle turbinates, superior turbinates, inferior, middle and superior meati and sphenoethmoidal recess.  No nasal polyps.  Septum DEVIATED LEFT     scope #: 215 [de-identified] : reason for laryngoscopy: unable to cooperate with mirror   Fiberoptic laryngoscopy was performed on the patient.  R/b/a was explained to the patient and they agreed to proceed with procedure. Nasal cavities: clear b/l, Nasopharynx: mild erythema and swelling, Oropharynx/Hypopharynx: + lingual tonsillar hypertrophy no masses or lesions, posterior pharyngeal wall with cobblestoning.  No BOT hypertrophy, vallecula is clear of any masses or cysts, Supraglottis: epiglottis sharp with normal bilateral arytenoids, aryepiglottic folds, ventricles but with + interarytenoid edema consistent with reflux, Glottis: clear, TVCs mobile b/l.  Subglottis clear  No pooling of secretions in the pyriform sinus.  Airway patent  Scope #: 215

## 2024-05-10 NOTE — CONSULT LETTER
[Dear  ___] : Dear  [unfilled], [Consult Letter:] : I had the pleasure of evaluating your patient, [unfilled]. [Please see my note below.] : Please see my note below. [Consult Closing:] : Thank you very much for allowing me to participate in the care of this patient.  If you have any questions, please do not hesitate to contact me. [FreeTextEntry2] : Citlalli Whelan [address] [facility], [facility] [FreeTextEntry3] : Alva Young MD Otolaryngology- Facial Plastics  [address] [place] [id number] (P) - [phone] (F) - [phone]

## 2024-05-14 ENCOUNTER — APPOINTMENT (OUTPATIENT)
Dept: OBGYN | Facility: CLINIC | Age: 64
End: 2024-05-14

## 2024-05-14 VITALS
BODY MASS INDEX: 17.07 KG/M2 | WEIGHT: 100 LBS | DIASTOLIC BLOOD PRESSURE: 85 MMHG | SYSTOLIC BLOOD PRESSURE: 145 MMHG | HEIGHT: 64 IN

## 2024-05-14 DIAGNOSIS — Z01.419 ENCOUNTER FOR GYNECOLOGICAL EXAMINATION (GENERAL) (ROUTINE) W/OUT ABNORMAL FINDINGS: ICD-10-CM

## 2024-05-14 DIAGNOSIS — N89.8 OTHER SPECIFIED NONINFLAMMATORY DISORDERS OF VAGINA: ICD-10-CM

## 2024-05-14 PROCEDURE — G0101: CPT

## 2024-05-14 PROCEDURE — 99459 PELVIC EXAMINATION: CPT

## 2024-05-14 PROCEDURE — 99202 OFFICE O/P NEW SF 15 MIN: CPT | Mod: 25

## 2024-05-14 NOTE — PHYSICAL EXAM
[Chaperone Present] : A chaperone was present in the examining room during all aspects of the physical examination [[id number]] : A chaperone was present during the pelvic exam. [Appropriately responsive] : appropriately responsive [Alert] : alert [No Acute Distress] : no acute distress [No Lymphadenopathy] : no lymphadenopathy [Regular Rate Rhythm] : regular rate rhythm [No Murmurs] : no murmurs [Clear to Auscultation B/L] : clear to auscultation bilaterally [Soft] : soft [Non-tender] : non-tender [Non-distended] : non-distended [No HSM] : No HSM [No Lesions] : no lesions [No Mass] : no mass [Oriented x3] : oriented x3 [Examination Of The Breasts] : a normal appearance [No Masses] : no breast masses were palpable [Labia Majora] : normal [Labia Minora] : normal [Normal] : normal [Uterine Adnexae] : normal [FreeTextEntry2] : Dominique Abarca  [Vulvar Atrophy] : vulvar atrophy [Atrophy] : atrophy [External Hemorrhoid] : external hemorrhoid

## 2024-05-14 NOTE — HISTORY OF PRESENT ILLNESS
[FreeTextEntry1] : 2024. MARCY STALLINGS 64-year-old female  LMP PM presents for annual gyn exam.  She reports intermittent vulvar irritation after she urinates managed w/ Desitin. No vaginal discharge. No urinary complaints. BM is normal per patient. She denies abdominal and pelvic pain.  Pt follows-up w/ Dr. Whelan for osteoporosis and is on Prolia.  Pt follows-up yearly w/ PCP.   GynHx: denies PMH: Anemia SHx: b/l mastectomy with reconstruction 2020, L ovary removal, ankle surgery, foot surgery  FHx: Father: Pancreatic ca. Denies FHx of breast, ovarian, uterine or colon cancer. Meds: Lipitor, Pontazozole, Lunesta, Prolia

## 2024-05-16 LAB — HPV HIGH+LOW RISK DNA PNL CVX: NOT DETECTED

## 2024-05-20 LAB — CYTOLOGY CVX/VAG DOC THIN PREP: ABNORMAL

## 2024-05-29 LAB
25(OH)D3 SERPL-MCNC: 69.4 NG/ML
ALBUMIN SERPL ELPH-MCNC: 4.9 G/DL
ALP BLD-CCNC: 58 U/L
ALT SERPL-CCNC: 14 U/L
ANION GAP SERPL CALC-SCNC: 11 MMOL/L
AST SERPL-CCNC: 15 U/L
BASOPHILS # BLD AUTO: 0.04 K/UL
BASOPHILS NFR BLD AUTO: 0.7 %
BILIRUB SERPL-MCNC: 0.3 MG/DL
BUN SERPL-MCNC: 19 MG/DL
CALCIUM SERPL-MCNC: 9.8 MG/DL
CHLORIDE SERPL-SCNC: 102 MMOL/L
CHOLEST SERPL-MCNC: 185 MG/DL
CK SERPL-CCNC: 96 U/L
CO2 SERPL-SCNC: 26 MMOL/L
CREAT SERPL-MCNC: 0.85 MG/DL
EGFR: 76 ML/MIN/1.73M2
EOSINOPHIL # BLD AUTO: 0.04 K/UL
EOSINOPHIL NFR BLD AUTO: 0.7 %
ESTIMATED AVERAGE GLUCOSE: 108 MG/DL
GLUCOSE SERPL-MCNC: 89 MG/DL
HBA1C MFR BLD HPLC: 5.4 %
HCT VFR BLD CALC: 37.9 %
HDLC SERPL-MCNC: 65 MG/DL
HGB BLD-MCNC: 11.9 G/DL
IMM GRANULOCYTES NFR BLD AUTO: 0.2 %
LDLC SERPL CALC-MCNC: 100 MG/DL
LYMPHOCYTES # BLD AUTO: 1.28 K/UL
LYMPHOCYTES NFR BLD AUTO: 23.4 %
MAN DIFF?: NORMAL
MCHC RBC-ENTMCNC: 30.4 PG
MCHC RBC-ENTMCNC: 31.4 GM/DL
MCV RBC AUTO: 96.9 FL
MONOCYTES # BLD AUTO: 0.46 K/UL
MONOCYTES NFR BLD AUTO: 8.4 %
NEUTROPHILS # BLD AUTO: 3.64 K/UL
NEUTROPHILS NFR BLD AUTO: 66.6 %
NONHDLC SERPL-MCNC: 120 MG/DL
PLATELET # BLD AUTO: 273 K/UL
POTASSIUM SERPL-SCNC: 4.7 MMOL/L
PROT SERPL-MCNC: 7 G/DL
RBC # BLD: 3.91 M/UL
RBC # FLD: 13.1 %
SODIUM SERPL-SCNC: 140 MMOL/L
T4 FREE SERPL-MCNC: 1.5 NG/DL
T4 SERPL-MCNC: 9.4 UG/DL
TRIGL SERPL-MCNC: 108 MG/DL
TSH SERPL-ACNC: 2.4 UIU/ML
VIT B12 SERPL-MCNC: 551 PG/ML
WBC # FLD AUTO: 5.47 K/UL

## 2024-06-07 ENCOUNTER — APPOINTMENT (OUTPATIENT)
Dept: OBGYN | Facility: CLINIC | Age: 64
End: 2024-06-07
Payer: MEDICARE

## 2024-06-07 ENCOUNTER — ASOB RESULT (OUTPATIENT)
Age: 64
End: 2024-06-07

## 2024-06-07 PROCEDURE — 76830 TRANSVAGINAL US NON-OB: CPT

## 2024-06-21 ENCOUNTER — TRANSCRIPTION ENCOUNTER (OUTPATIENT)
Age: 64
End: 2024-06-21

## 2024-06-28 ENCOUNTER — TRANSCRIPTION ENCOUNTER (OUTPATIENT)
Age: 64
End: 2024-06-28

## 2025-01-03 ENCOUNTER — APPOINTMENT (OUTPATIENT)
Dept: OTOLARYNGOLOGY | Facility: CLINIC | Age: 65
End: 2025-01-03
Payer: MEDICARE

## 2025-01-03 VITALS
DIASTOLIC BLOOD PRESSURE: 81 MMHG | SYSTOLIC BLOOD PRESSURE: 120 MMHG | WEIGHT: 100 LBS | BODY MASS INDEX: 17.07 KG/M2 | HEIGHT: 64 IN | HEART RATE: 65 BPM | OXYGEN SATURATION: 100 %

## 2025-01-03 DIAGNOSIS — R09.81 NASAL CONGESTION: ICD-10-CM

## 2025-01-03 DIAGNOSIS — H93.8X9 OTHER SPECIFIED DISORDERS OF EAR, UNSPECIFIED EAR: ICD-10-CM

## 2025-01-03 DIAGNOSIS — R09.82 POSTNASAL DRIP: ICD-10-CM

## 2025-01-03 PROCEDURE — 31231 NASAL ENDOSCOPY DX: CPT

## 2025-01-03 PROCEDURE — 69210 REMOVE IMPACTED EAR WAX UNI: CPT

## 2025-01-03 PROCEDURE — 99214 OFFICE O/P EST MOD 30 MIN: CPT | Mod: 25

## 2025-01-03 RX ORDER — FLUTICASONE PROPIONATE 50 UG/1
50 SPRAY, METERED NASAL DAILY
Qty: 1 | Refills: 3 | Status: ACTIVE | COMMUNITY
Start: 2025-01-03 | End: 1900-01-01

## 2025-01-03 RX ORDER — DOXYCYCLINE 100 MG/1
100 CAPSULE ORAL
Qty: 14 | Refills: 0 | Status: ACTIVE | COMMUNITY
Start: 2025-01-03 | End: 1900-01-01

## 2025-01-03 RX ORDER — AMOXICILLIN AND CLAVULANATE POTASSIUM 875; 125 MG/1; MG/1
875-125 TABLET, COATED ORAL
Qty: 14 | Refills: 0 | Status: COMPLETED | COMMUNITY
Start: 2025-01-03 | End: 2025-01-10

## 2025-03-17 NOTE — HEALTH RISK ASSESSMENT
[Good] : ~his/her~  mood as  good [Yes] : Yes [Monthly or less (1 pt)] : Monthly or less (1 point) 172.72 [1 or 2 (0 pts)] : 1 or 2 (0 points) [Never (0 pts)] : Never (0 points) [Patient reported PAP Smear was normal] : Patient reported PAP Smear was normal [Patient reported bone density results were abnormal] : Patient reported bone density results were abnormal [HIV test declined] : HIV test declined [Hepatitis C test declined] : Hepatitis C test declined [None] : None [Change in mental status noted] : Change in mental status noted [Employed] : employed [] :  [Sexually Active] : sexually active [# Of Children ___] : has [unfilled] children [Feels Safe at Home] : Feels safe at home [Fully functional (bathing, dressing, toileting, transferring, walking, feeding)] : Fully functional (bathing, dressing, toileting, transferring, walking, feeding) [Fully functional (using the telephone, shopping, preparing meals, housekeeping, doing laundry, using] : Fully functional and needs no help or supervision to perform IADLs (using the telephone, shopping, preparing meals, housekeeping, doing laundry, using transportation, managing medications and managing finances) [Smoke Detector] : smoke detector [Seat Belt] :  uses seat belt [Carbon Monoxide Detector] : carbon monoxide detector [Sunscreen] : uses sunscreen [Never] : Never [Audit-CScore] : 1 [Reports changes in hearing] : Reports no changes in hearing [Reports changes in vision] : Reports no changes in vision [Reports changes in dental health] : Reports no changes in dental health [PapSmearDate] : 2023 [BoneDensityDate] : 2023 [BoneDensityComments] : on prolia [ColonoscopyDate] : 1/203 [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

## 2025-04-17 ENCOUNTER — NON-APPOINTMENT (OUTPATIENT)
Age: 65
End: 2025-04-17

## 2025-04-21 ENCOUNTER — NON-APPOINTMENT (OUTPATIENT)
Age: 65
End: 2025-04-21

## 2025-04-22 DIAGNOSIS — E53.8 DEFICIENCY OF OTHER SPECIFIED B GROUP VITAMINS: ICD-10-CM

## 2025-04-22 DIAGNOSIS — E04.1 NONTOXIC SINGLE THYROID NODULE: ICD-10-CM

## 2025-04-22 DIAGNOSIS — E55.9 VITAMIN D DEFICIENCY, UNSPECIFIED: ICD-10-CM

## 2025-04-23 ENCOUNTER — APPOINTMENT (OUTPATIENT)
Dept: INTERNAL MEDICINE | Facility: CLINIC | Age: 65
End: 2025-04-23
Payer: MEDICARE

## 2025-04-23 ENCOUNTER — NON-APPOINTMENT (OUTPATIENT)
Age: 65
End: 2025-04-23

## 2025-04-23 VITALS
DIASTOLIC BLOOD PRESSURE: 91 MMHG | SYSTOLIC BLOOD PRESSURE: 152 MMHG | HEART RATE: 75 BPM | HEIGHT: 64 IN | OXYGEN SATURATION: 97 % | WEIGHT: 98 LBS | TEMPERATURE: 98 F | BODY MASS INDEX: 16.73 KG/M2

## 2025-04-23 VITALS — SYSTOLIC BLOOD PRESSURE: 122 MMHG | DIASTOLIC BLOOD PRESSURE: 76 MMHG

## 2025-04-23 DIAGNOSIS — R00.2 PALPITATIONS: ICD-10-CM

## 2025-04-23 DIAGNOSIS — K21.9 GASTRO-ESOPHAGEAL REFLUX DISEASE W/OUT ESOPHAGITIS: ICD-10-CM

## 2025-04-23 DIAGNOSIS — R73.9 HYPERGLYCEMIA, UNSPECIFIED: ICD-10-CM

## 2025-04-23 DIAGNOSIS — Z00.00 ENCOUNTER FOR GENERAL ADULT MEDICAL EXAMINATION W/OUT ABNORMAL FINDINGS: ICD-10-CM

## 2025-04-23 DIAGNOSIS — E78.00 PURE HYPERCHOLESTEROLEMIA, UNSPECIFIED: ICD-10-CM

## 2025-04-23 DIAGNOSIS — R10.13 EPIGASTRIC PAIN: ICD-10-CM

## 2025-04-23 PROCEDURE — 93000 ELECTROCARDIOGRAM COMPLETE: CPT

## 2025-04-23 PROCEDURE — 81003 URINALYSIS AUTO W/O SCOPE: CPT | Mod: QW

## 2025-04-23 PROCEDURE — G0439: CPT

## 2025-04-23 PROCEDURE — 36415 COLL VENOUS BLD VENIPUNCTURE: CPT

## 2025-04-24 LAB
25(OH)D3 SERPL-MCNC: 59.1 NG/ML
ALBUMIN SERPL ELPH-MCNC: 4.8 G/DL
ALP BLD-CCNC: 52 U/L
ALT SERPL-CCNC: 13 U/L
ANION GAP SERPL CALC-SCNC: 16 MMOL/L
AST SERPL-CCNC: 19 U/L
BASOPHILS # BLD AUTO: 0.03 K/UL
BASOPHILS NFR BLD AUTO: 0.4 %
BILIRUB SERPL-MCNC: 0.4 MG/DL
BUN SERPL-MCNC: 18 MG/DL
CALCIUM SERPL-MCNC: 9.6 MG/DL
CHLORIDE SERPL-SCNC: 105 MMOL/L
CHOLEST SERPL-MCNC: 190 MG/DL
CK SERPL-CCNC: 112 U/L
CO2 SERPL-SCNC: 24 MMOL/L
CREAT SERPL-MCNC: 0.98 MG/DL
EGFRCR SERPLBLD CKD-EPI 2021: 63 ML/MIN/1.73M2
EOSINOPHIL # BLD AUTO: 0.03 K/UL
EOSINOPHIL NFR BLD AUTO: 0.4 %
ESTIMATED AVERAGE GLUCOSE: 108 MG/DL
GLUCOSE SERPL-MCNC: 89 MG/DL
HBA1C MFR BLD HPLC: 5.4 %
HCT VFR BLD CALC: 37.9 %
HDLC SERPL-MCNC: 72 MG/DL
HGB BLD-MCNC: 11.8 G/DL
IMM GRANULOCYTES NFR BLD AUTO: 0.3 %
LDLC SERPL-MCNC: 105 MG/DL
LYMPHOCYTES # BLD AUTO: 1.25 K/UL
LYMPHOCYTES NFR BLD AUTO: 18.7 %
MAN DIFF?: NORMAL
MCHC RBC-ENTMCNC: 30.6 PG
MCHC RBC-ENTMCNC: 31.1 G/DL
MCV RBC AUTO: 98.2 FL
MONOCYTES # BLD AUTO: 0.53 K/UL
MONOCYTES NFR BLD AUTO: 7.9 %
NEUTROPHILS # BLD AUTO: 4.84 K/UL
NEUTROPHILS NFR BLD AUTO: 72.3 %
NONHDLC SERPL-MCNC: 118 MG/DL
PLATELET # BLD AUTO: 282 K/UL
POTASSIUM SERPL-SCNC: 4.1 MMOL/L
PROT SERPL-MCNC: 7.1 G/DL
RBC # BLD: 3.86 M/UL
RBC # FLD: 12.9 %
SODIUM SERPL-SCNC: 146 MMOL/L
T4 FREE SERPL-MCNC: 1.3 NG/DL
T4 SERPL-MCNC: 10.2 UG/DL
TRIGL SERPL-MCNC: 73 MG/DL
TSH SERPL-ACNC: 1.52 UIU/ML
VIT B12 SERPL-MCNC: 432 PG/ML
WBC # FLD AUTO: 6.7 K/UL

## 2025-04-30 ENCOUNTER — ASOB RESULT (OUTPATIENT)
Age: 65
End: 2025-04-30

## 2025-04-30 ENCOUNTER — APPOINTMENT (OUTPATIENT)
Dept: OBGYN | Facility: CLINIC | Age: 65
End: 2025-04-30
Payer: MEDICARE

## 2025-04-30 PROCEDURE — 76830 TRANSVAGINAL US NON-OB: CPT

## 2025-04-30 PROCEDURE — 99212 OFFICE O/P EST SF 10 MIN: CPT

## 2025-05-01 ENCOUNTER — TRANSCRIPTION ENCOUNTER (OUTPATIENT)
Age: 65
End: 2025-05-01

## 2025-05-01 DIAGNOSIS — R10.31 RIGHT LOWER QUADRANT PAIN: ICD-10-CM

## 2025-05-01 LAB
APPEARANCE: CLEAR
BACTERIA: NEGATIVE /HPF
BILIRUBIN URINE: NEGATIVE
BLOOD URINE: NEGATIVE
CAST: 0 /LPF
COLOR: YELLOW
EPITHELIAL CELLS: 0 /HPF
GLUCOSE QUALITATIVE U: NEGATIVE MG/DL
KETONES URINE: NEGATIVE MG/DL
LEUKOCYTE ESTERASE URINE: NEGATIVE
MICROSCOPIC-UA: NORMAL
NITRITE URINE: NEGATIVE
PH URINE: 6.5
PROTEIN URINE: NEGATIVE MG/DL
RED BLOOD CELLS URINE: 1 /HPF
SPECIFIC GRAVITY URINE: 1.01
UROBILINOGEN URINE: 0.2 MG/DL
WHITE BLOOD CELLS URINE: 0 /HPF

## 2025-05-02 ENCOUNTER — TRANSCRIPTION ENCOUNTER (OUTPATIENT)
Age: 65
End: 2025-05-02

## 2025-05-02 LAB — BACTERIA UR CULT: NORMAL

## 2025-05-20 ENCOUNTER — APPOINTMENT (OUTPATIENT)
Dept: OBGYN | Facility: CLINIC | Age: 65
End: 2025-05-20
Payer: MEDICARE

## 2025-05-20 VITALS
BODY MASS INDEX: 16.9 KG/M2 | DIASTOLIC BLOOD PRESSURE: 81 MMHG | SYSTOLIC BLOOD PRESSURE: 137 MMHG | WEIGHT: 99 LBS | HEIGHT: 64 IN

## 2025-05-20 DIAGNOSIS — Z13.31 ENCOUNTER FOR SCREENING FOR DEPRESSION: ICD-10-CM

## 2025-05-20 DIAGNOSIS — D05.10 INTRADUCTAL CARCINOMA IN SITU OF UNSPECIFIED BREAST: ICD-10-CM

## 2025-05-20 DIAGNOSIS — Z01.419 ENCOUNTER FOR GYNECOLOGICAL EXAMINATION (GENERAL) (ROUTINE) W/OUT ABNORMAL FINDINGS: ICD-10-CM

## 2025-05-20 DIAGNOSIS — M81.0 AGE-RELATED OSTEOPOROSIS W/OUT CURRENT PATHOLOGICAL FRACTURE: ICD-10-CM

## 2025-05-20 PROCEDURE — G0101: CPT

## 2025-05-20 PROCEDURE — G0328 FECAL BLOOD SCRN IMMUNOASSAY: CPT | Mod: QW

## 2025-05-20 PROCEDURE — G0444 DEPRESSION SCREEN ANNUAL: CPT

## 2025-05-20 PROCEDURE — G0101: CPT | Mod: GA

## 2025-05-22 LAB — HPV HIGH+LOW RISK DNA PNL CVX: NOT DETECTED

## 2025-05-23 LAB — CYTOLOGY CVX/VAG DOC THIN PREP: ABNORMAL

## 2025-06-06 ENCOUNTER — TRANSCRIPTION ENCOUNTER (OUTPATIENT)
Age: 65
End: 2025-06-06

## 2025-06-09 ENCOUNTER — TRANSCRIPTION ENCOUNTER (OUTPATIENT)
Age: 65
End: 2025-06-09